# Patient Record
Sex: MALE | Race: WHITE | Employment: OTHER | URBAN - METROPOLITAN AREA
[De-identification: names, ages, dates, MRNs, and addresses within clinical notes are randomized per-mention and may not be internally consistent; named-entity substitution may affect disease eponyms.]

---

## 2017-05-18 ENCOUNTER — TELEPHONE (OUTPATIENT)
Dept: CARDIOLOGY CLINIC | Age: 62
End: 2017-05-18

## 2017-05-18 NOTE — TELEPHONE ENCOUNTER
Per Dr aJhaira Cowan needs a follow with him and device check. PSR tried to contact but numbers are disconnected. Tried emergency contact but no answer or VM. PSR will try to reach again later.

## 2017-05-23 ENCOUNTER — TELEPHONE (OUTPATIENT)
Dept: CARDIOLOGY CLINIC | Age: 62
End: 2017-05-23

## 2017-05-23 NOTE — LETTER
5/23/2017 10:17 AM 
 
Mr. Willian Raman [address] [facility] [address] We have tried to reach you several times by phone but the numbers we have listed for you are not correct. Dr Twyla Blancas would like for you to make an appointment to follow up with him. If you can contact our office as soon as your able to at [phone].    
 
 
 
 
 
 
Sincerely, 
 
 
Viridiana Mauricio MD

## 2017-05-23 NOTE — TELEPHONE ENCOUNTER
----- Message from Latonya Glass sent at 5/18/2017  1:58 PM EDT -----    I reached out to the patient and was told that I had the wrong number. Also contacted emergency contact and the voicemail was not set up so I was unable to leave a message. Will try back at a later time. Thanks!    ----- Message -----     From: Bianca Powers RN     Sent: 5/3/2017  12:22 PM       To: Frank Squires LPN, Latonya Glass  Subject: FWDaphyllis Kaplan - will you try to reach daughter t#     Destinee Figueroa - last conversation was Dec 20 -   Would you mind trying to reach daughter for follow up re: device and Dr. Alphonso Kerr   Thanks!      12/20/16 10:16 AM   ----- Message from Susie Garcia MD sent at 12/19/2016 10:18 PM EST -----  Discharged tonight  Left leg fem pop  Toe amputated  Not sure when he can follow up in office for ICD but may try to make appt when you can and if they cannot come then pls document.    ----- Message -----     From: Susie Garcia MD     Sent: 10/20/2016  10:16 AM       To: Orvilla Boeck, Bianca Powers, RN  Subject: RE: recall, no merlin, non-compliant, social#    Daughter has been involved  Shonda Guillermo spoke to them many times  May be we should send letter if camille cannot get a hold of daughter  If in hospice then I do not think we need to do anything more  ----- Message -----     From: Irina Granado     Sent: 10/20/2016   9:31 AM       To: Susie Garcia MD, [facility], RN, #  Subject: recall, no merlin, non-compliant, social iss#    Hi,    I see he may be in Hospice, MAYE approaching, V pacing <3% in 2014 (last check). He missed many office visits. I read the notes. I mailed out the letter, also requested an ICD check. I am not sure if he is in Hospice, if ICD therapies are off or not. He was not explanted, right? Jarome Kales, please follow up with Mr Filomena Lyonsve [facility]) if we need to address.     Thanks, Sanna Magaña

## 2018-04-07 ENCOUNTER — ANESTHESIA (OUTPATIENT)
Dept: SURGERY | Age: 63
DRG: 253 | End: 2018-04-07
Payer: MEDICARE

## 2018-04-07 ENCOUNTER — ANESTHESIA EVENT (OUTPATIENT)
Dept: SURGERY | Age: 63
DRG: 253 | End: 2018-04-07
Payer: MEDICARE

## 2018-04-07 ENCOUNTER — APPOINTMENT (OUTPATIENT)
Dept: GENERAL RADIOLOGY | Age: 63
DRG: 253 | End: 2018-04-07
Attending: EMERGENCY MEDICINE
Payer: MEDICARE

## 2018-04-07 ENCOUNTER — APPOINTMENT (OUTPATIENT)
Dept: GENERAL RADIOLOGY | Age: 63
DRG: 253 | End: 2018-04-07
Attending: SURGERY
Payer: MEDICARE

## 2018-04-07 ENCOUNTER — APPOINTMENT (OUTPATIENT)
Dept: CT IMAGING | Age: 63
DRG: 253 | End: 2018-04-07
Attending: EMERGENCY MEDICINE
Payer: MEDICARE

## 2018-04-07 ENCOUNTER — HOSPITAL ENCOUNTER (INPATIENT)
Age: 63
LOS: 5 days | Discharge: NURSING, REHAB OR LONG-TERM CARE | DRG: 253 | End: 2018-04-12
Attending: EMERGENCY MEDICINE | Admitting: SURGERY
Payer: MEDICARE

## 2018-04-07 ENCOUNTER — APPOINTMENT (OUTPATIENT)
Dept: CT IMAGING | Age: 63
DRG: 253 | End: 2018-04-07
Attending: SURGERY
Payer: MEDICARE

## 2018-04-07 DIAGNOSIS — I73.9 COLD FOOT WITH PERIPHERAL VASCULAR DISEASE (HCC): ICD-10-CM

## 2018-04-07 DIAGNOSIS — I99.8 ISCHEMIA OF RIGHT LOWER EXTREMITY: Primary | ICD-10-CM

## 2018-04-07 LAB
ALBUMIN SERPL-MCNC: 2.9 G/DL (ref 3.5–5)
ALBUMIN/GLOB SERPL: 0.6 {RATIO} (ref 1.1–2.2)
ALP SERPL-CCNC: 131 U/L (ref 45–117)
ALT SERPL-CCNC: 26 U/L (ref 12–78)
ANION GAP SERPL CALC-SCNC: 7 MMOL/L (ref 5–15)
ANION GAP SERPL CALC-SCNC: 9 MMOL/L (ref 5–15)
APTT PPP: 86.1 SEC (ref 22.1–32)
AST SERPL-CCNC: 43 U/L (ref 15–37)
BASOPHILS # BLD: 0 K/UL (ref 0–0.1)
BASOPHILS # BLD: 0.1 K/UL (ref 0–0.1)
BASOPHILS NFR BLD: 0 % (ref 0–1)
BASOPHILS NFR BLD: 0 % (ref 0–1)
BILIRUB SERPL-MCNC: 0.9 MG/DL (ref 0.2–1)
BUN SERPL-MCNC: 5 MG/DL (ref 6–20)
BUN SERPL-MCNC: 7 MG/DL (ref 6–20)
BUN/CREAT SERPL: 13 (ref 12–20)
BUN/CREAT SERPL: 9 (ref 12–20)
CALCIUM SERPL-MCNC: 7.5 MG/DL (ref 8.5–10.1)
CALCIUM SERPL-MCNC: 8.7 MG/DL (ref 8.5–10.1)
CHLORIDE SERPL-SCNC: 104 MMOL/L (ref 97–108)
CHLORIDE SERPL-SCNC: 107 MMOL/L (ref 97–108)
CO2 SERPL-SCNC: 23 MMOL/L (ref 21–32)
CO2 SERPL-SCNC: 27 MMOL/L (ref 21–32)
CREAT SERPL-MCNC: 0.53 MG/DL (ref 0.7–1.3)
CREAT SERPL-MCNC: 0.55 MG/DL (ref 0.7–1.3)
DIFFERENTIAL METHOD BLD: ABNORMAL
DIFFERENTIAL METHOD BLD: ABNORMAL
EOSINOPHIL # BLD: 0 K/UL (ref 0–0.4)
EOSINOPHIL # BLD: 0.1 K/UL (ref 0–0.4)
EOSINOPHIL NFR BLD: 0 % (ref 0–7)
EOSINOPHIL NFR BLD: 1 % (ref 0–7)
ERYTHROCYTE [DISTWIDTH] IN BLOOD BY AUTOMATED COUNT: 12.9 % (ref 11.5–14.5)
ERYTHROCYTE [DISTWIDTH] IN BLOOD BY AUTOMATED COUNT: 13.1 % (ref 11.5–14.5)
GLOBULIN SER CALC-MCNC: 4.8 G/DL (ref 2–4)
GLUCOSE BLD STRIP.AUTO-MCNC: 85 MG/DL (ref 65–100)
GLUCOSE SERPL-MCNC: 140 MG/DL (ref 65–100)
GLUCOSE SERPL-MCNC: 74 MG/DL (ref 65–100)
HCT VFR BLD AUTO: 39.9 % (ref 36.6–50.3)
HCT VFR BLD AUTO: 47.3 % (ref 36.6–50.3)
HGB BLD-MCNC: 13.4 G/DL (ref 12.1–17)
HGB BLD-MCNC: 16 G/DL (ref 12.1–17)
IMM GRANULOCYTES # BLD: 0.1 K/UL (ref 0–0.04)
IMM GRANULOCYTES # BLD: 0.2 K/UL (ref 0–0.04)
IMM GRANULOCYTES NFR BLD AUTO: 1 % (ref 0–0.5)
IMM GRANULOCYTES NFR BLD AUTO: 1 % (ref 0–0.5)
INR PPP: 1.1 (ref 0.9–1.1)
LACTATE SERPL-SCNC: 1.7 MMOL/L (ref 0.4–2)
LYMPHOCYTES # BLD: 0.4 K/UL (ref 0.8–3.5)
LYMPHOCYTES # BLD: 1.9 K/UL (ref 0.8–3.5)
LYMPHOCYTES NFR BLD: 13 % (ref 12–49)
LYMPHOCYTES NFR BLD: 3 % (ref 12–49)
MCH RBC QN AUTO: 32.2 PG (ref 26–34)
MCH RBC QN AUTO: 32.5 PG (ref 26–34)
MCHC RBC AUTO-ENTMCNC: 33.6 G/DL (ref 30–36.5)
MCHC RBC AUTO-ENTMCNC: 33.8 G/DL (ref 30–36.5)
MCV RBC AUTO: 95.9 FL (ref 80–99)
MCV RBC AUTO: 95.9 FL (ref 80–99)
MONOCYTES # BLD: 0.3 K/UL (ref 0–1)
MONOCYTES # BLD: 1.5 K/UL (ref 0–1)
MONOCYTES NFR BLD: 10 % (ref 5–13)
MONOCYTES NFR BLD: 2 % (ref 5–13)
NEUTS SEG # BLD: 11.7 K/UL (ref 1.8–8)
NEUTS SEG # BLD: 13.9 K/UL (ref 1.8–8)
NEUTS SEG NFR BLD: 76 % (ref 32–75)
NEUTS SEG NFR BLD: 94 % (ref 32–75)
NRBC # BLD: 0 K/UL (ref 0–0.01)
NRBC # BLD: 0 K/UL (ref 0–0.01)
NRBC BLD-RTO: 0 PER 100 WBC
NRBC BLD-RTO: 0 PER 100 WBC
PLATELET # BLD AUTO: 126 K/UL (ref 150–400)
PLATELET # BLD AUTO: 148 K/UL (ref 150–400)
PMV BLD AUTO: 10.8 FL (ref 8.9–12.9)
PMV BLD AUTO: 10.9 FL (ref 8.9–12.9)
POTASSIUM SERPL-SCNC: 3.3 MMOL/L (ref 3.5–5.1)
POTASSIUM SERPL-SCNC: 3.4 MMOL/L (ref 3.5–5.1)
PROT SERPL-MCNC: 7.7 G/DL (ref 6.4–8.2)
PROTHROMBIN TIME: 11.7 SEC (ref 9–11.1)
RBC # BLD AUTO: 4.16 M/UL (ref 4.1–5.7)
RBC # BLD AUTO: 4.93 M/UL (ref 4.1–5.7)
RBC MORPH BLD: ABNORMAL
SERVICE CMNT-IMP: NORMAL
SODIUM SERPL-SCNC: 138 MMOL/L (ref 136–145)
SODIUM SERPL-SCNC: 139 MMOL/L (ref 136–145)
THERAPEUTIC RANGE,PTTT: ABNORMAL SECS (ref 58–77)
WBC # BLD AUTO: 14.7 K/UL (ref 4.1–11.1)
WBC # BLD AUTO: 15.5 K/UL (ref 4.1–11.1)

## 2018-04-07 PROCEDURE — B4101ZZ FLUOROSCOPY OF ABDOMINAL AORTA USING LOW OSMOLAR CONTRAST: ICD-10-PCS | Performed by: SURGERY

## 2018-04-07 PROCEDURE — C1725 CATH, TRANSLUMIN NON-LASER: HCPCS | Performed by: SURGERY

## 2018-04-07 PROCEDURE — 74011250636 HC RX REV CODE- 250/636

## 2018-04-07 PROCEDURE — 74011000250 HC RX REV CODE- 250

## 2018-04-07 PROCEDURE — 76060000037 HC ANESTHESIA 3 TO 3.5 HR: Performed by: SURGERY

## 2018-04-07 PROCEDURE — C1757 CATH, THROMBECTOMY/EMBOLECT: HCPCS | Performed by: SURGERY

## 2018-04-07 PROCEDURE — 74011250636 HC RX REV CODE- 250/636: Performed by: ANESTHESIOLOGY

## 2018-04-07 PROCEDURE — 77030034850: Performed by: SURGERY

## 2018-04-07 PROCEDURE — 76010000133 HC OR TIME 3 TO 3.5 HR: Performed by: SURGERY

## 2018-04-07 PROCEDURE — 80053 COMPREHEN METABOLIC PANEL: CPT | Performed by: EMERGENCY MEDICINE

## 2018-04-07 PROCEDURE — 74011250636 HC RX REV CODE- 250/636: Performed by: EMERGENCY MEDICINE

## 2018-04-07 PROCEDURE — 77030008467 HC STPLR SKN COVD -B: Performed by: SURGERY

## 2018-04-07 PROCEDURE — 77030002916 HC SUT ETHLN J&J -A: Performed by: SURGERY

## 2018-04-07 PROCEDURE — C1769 GUIDE WIRE: HCPCS | Performed by: SURGERY

## 2018-04-07 PROCEDURE — 85730 THROMBOPLASTIN TIME PARTIAL: CPT | Performed by: EMERGENCY MEDICINE

## 2018-04-07 PROCEDURE — 94762 N-INVAS EAR/PLS OXIMTRY CONT: CPT

## 2018-04-07 PROCEDURE — 71045 X-RAY EXAM CHEST 1 VIEW: CPT

## 2018-04-07 PROCEDURE — 80048 BASIC METABOLIC PNL TOTAL CA: CPT | Performed by: SURGERY

## 2018-04-07 PROCEDURE — C1874 STENT, COATED/COV W/DEL SYS: HCPCS | Performed by: SURGERY

## 2018-04-07 PROCEDURE — 99285 EMERGENCY DEPT VISIT HI MDM: CPT

## 2018-04-07 PROCEDURE — 77030005515 HC CATH URETH FOL14 BARD -B: Performed by: SURGERY

## 2018-04-07 PROCEDURE — 74011000272 HC RX REV CODE- 272: Performed by: SURGERY

## 2018-04-07 PROCEDURE — 65660000000 HC RM CCU STEPDOWN

## 2018-04-07 PROCEDURE — 74011636320 HC RX REV CODE- 636/320: Performed by: EMERGENCY MEDICINE

## 2018-04-07 PROCEDURE — 77030010507 HC ADH SKN DERMBND J&J -B: Performed by: SURGERY

## 2018-04-07 PROCEDURE — P9045 ALBUMIN (HUMAN), 5%, 250 ML: HCPCS

## 2018-04-07 PROCEDURE — 77030014007 HC SPNG HEMSTAT J&J -B: Performed by: SURGERY

## 2018-04-07 PROCEDURE — 36415 COLL VENOUS BLD VENIPUNCTURE: CPT | Performed by: EMERGENCY MEDICINE

## 2018-04-07 PROCEDURE — 74018 RADEX ABDOMEN 1 VIEW: CPT

## 2018-04-07 PROCEDURE — 77030031139 HC SUT VCRL2 J&J -A: Performed by: SURGERY

## 2018-04-07 PROCEDURE — 74011250636 HC RX REV CODE- 250/636: Performed by: SURGERY

## 2018-04-07 PROCEDURE — 83605 ASSAY OF LACTIC ACID: CPT | Performed by: EMERGENCY MEDICINE

## 2018-04-07 PROCEDURE — 77030020256 HC SOL INJ NACL 0.9%  500ML: Performed by: SURGERY

## 2018-04-07 PROCEDURE — 77030008684 HC TU ET CUF COVD -B: Performed by: ANESTHESIOLOGY

## 2018-04-07 PROCEDURE — 77030013079 HC BLNKT BAIR HGGR 3M -A: Performed by: ANESTHESIOLOGY

## 2018-04-07 PROCEDURE — 74011000258 HC RX REV CODE- 258: Performed by: EMERGENCY MEDICINE

## 2018-04-07 PROCEDURE — 93005 ELECTROCARDIOGRAM TRACING: CPT

## 2018-04-07 PROCEDURE — 77030002987 HC SUT PROL J&J -B: Performed by: SURGERY

## 2018-04-07 PROCEDURE — C1894 INTRO/SHEATH, NON-LASER: HCPCS | Performed by: SURGERY

## 2018-04-07 PROCEDURE — 77030018673: Performed by: SURGERY

## 2018-04-07 PROCEDURE — 77030026438 HC STYL ET INTUB CARD -A: Performed by: ANESTHESIOLOGY

## 2018-04-07 PROCEDURE — 77030013060 HC DEV INFL PRSS MRTM -B: Performed by: SURGERY

## 2018-04-07 PROCEDURE — 77030002924 HC SUT GORTX WLGO -B: Performed by: SURGERY

## 2018-04-07 PROCEDURE — 85610 PROTHROMBIN TIME: CPT | Performed by: EMERGENCY MEDICINE

## 2018-04-07 PROCEDURE — 88304 TISSUE EXAM BY PATHOLOGIST: CPT | Performed by: SURGERY

## 2018-04-07 PROCEDURE — 74011000250 HC RX REV CODE- 250: Performed by: SURGERY

## 2018-04-07 PROCEDURE — 0J8N0ZZ DIVISION OF RIGHT LOWER LEG SUBCUTANEOUS TISSUE AND FASCIA, OPEN APPROACH: ICD-10-PCS | Performed by: SURGERY

## 2018-04-07 PROCEDURE — C1768 GRAFT, VASCULAR: HCPCS | Performed by: SURGERY

## 2018-04-07 PROCEDURE — 77030011640 HC PAD GRND REM COVD -A: Performed by: SURGERY

## 2018-04-07 PROCEDURE — 77030002986 HC SUT PROL J&J -A: Performed by: SURGERY

## 2018-04-07 PROCEDURE — 77030003704 HC NDL VASC ACC ARMD -A: Performed by: SURGERY

## 2018-04-07 PROCEDURE — 75635 CT ANGIO ABDOMINAL ARTERIES: CPT

## 2018-04-07 PROCEDURE — 76210000016 HC OR PH I REC 1 TO 1.5 HR: Performed by: SURGERY

## 2018-04-07 PROCEDURE — 82962 GLUCOSE BLOOD TEST: CPT

## 2018-04-07 PROCEDURE — 76001 XR FLUOROSCOPY OVER 60 MINUTES: CPT

## 2018-04-07 PROCEDURE — 77030002996 HC SUT SLK J&J -A: Performed by: SURGERY

## 2018-04-07 PROCEDURE — C1887 CATHETER, GUIDING: HCPCS | Performed by: SURGERY

## 2018-04-07 PROCEDURE — 85025 COMPLETE CBC W/AUTO DIFF WBC: CPT | Performed by: EMERGENCY MEDICINE

## 2018-04-07 PROCEDURE — 04CK0ZZ EXTIRPATION OF MATTER FROM RIGHT FEMORAL ARTERY, OPEN APPROACH: ICD-10-PCS | Performed by: SURGERY

## 2018-04-07 PROCEDURE — 047K3DZ DILATION OF RIGHT FEMORAL ARTERY WITH INTRALUMINAL DEVICE, PERCUTANEOUS APPROACH: ICD-10-PCS | Performed by: SURGERY

## 2018-04-07 DEVICE — IMPLANTABLE DEVICE: Type: IMPLANTABLE DEVICE | Site: GROIN | Status: FUNCTIONAL

## 2018-04-07 DEVICE — HEMASHIELD PLATINUM FINESSE ULTRA-THIN KNITTED CARDIOVASCULAR PATCH
Type: IMPLANTABLE DEVICE | Site: GROIN | Status: FUNCTIONAL
Brand: HEMASHIELD

## 2018-04-07 RX ORDER — LIDOCAINE HYDROCHLORIDE 20 MG/ML
INJECTION, SOLUTION EPIDURAL; INFILTRATION; INTRACAUDAL; PERINEURAL AS NEEDED
Status: DISCONTINUED | OUTPATIENT
Start: 2018-04-07 | End: 2018-04-07 | Stop reason: HOSPADM

## 2018-04-07 RX ORDER — SODIUM CHLORIDE 0.9 % (FLUSH) 0.9 %
5-10 SYRINGE (ML) INJECTION AS NEEDED
Status: DISCONTINUED | OUTPATIENT
Start: 2018-04-07 | End: 2018-04-07 | Stop reason: HOSPADM

## 2018-04-07 RX ORDER — SODIUM CHLORIDE 0.9 % (FLUSH) 0.9 %
10 SYRINGE (ML) INJECTION
Status: COMPLETED | OUTPATIENT
Start: 2018-04-07 | End: 2018-04-07

## 2018-04-07 RX ORDER — FENTANYL CITRATE 50 UG/ML
INJECTION, SOLUTION INTRAMUSCULAR; INTRAVENOUS AS NEEDED
Status: DISCONTINUED | OUTPATIENT
Start: 2018-04-07 | End: 2018-04-07 | Stop reason: HOSPADM

## 2018-04-07 RX ORDER — HYDROMORPHONE HYDROCHLORIDE 2 MG/ML
0.2 INJECTION, SOLUTION INTRAMUSCULAR; INTRAVENOUS; SUBCUTANEOUS
Status: DISCONTINUED | OUTPATIENT
Start: 2018-04-07 | End: 2018-04-08 | Stop reason: HOSPADM

## 2018-04-07 RX ORDER — SODIUM CHLORIDE, SODIUM LACTATE, POTASSIUM CHLORIDE, CALCIUM CHLORIDE 600; 310; 30; 20 MG/100ML; MG/100ML; MG/100ML; MG/100ML
100 INJECTION, SOLUTION INTRAVENOUS CONTINUOUS
Status: DISCONTINUED | OUTPATIENT
Start: 2018-04-07 | End: 2018-04-08 | Stop reason: HOSPADM

## 2018-04-07 RX ORDER — SUCCINYLCHOLINE CHLORIDE 20 MG/ML
INJECTION INTRAMUSCULAR; INTRAVENOUS AS NEEDED
Status: DISCONTINUED | OUTPATIENT
Start: 2018-04-07 | End: 2018-04-07 | Stop reason: HOSPADM

## 2018-04-07 RX ORDER — GLYCOPYRROLATE 0.2 MG/ML
INJECTION INTRAMUSCULAR; INTRAVENOUS AS NEEDED
Status: DISCONTINUED | OUTPATIENT
Start: 2018-04-07 | End: 2018-04-07 | Stop reason: HOSPADM

## 2018-04-07 RX ORDER — DEXAMETHASONE SODIUM PHOSPHATE 4 MG/ML
INJECTION, SOLUTION INTRA-ARTICULAR; INTRALESIONAL; INTRAMUSCULAR; INTRAVENOUS; SOFT TISSUE AS NEEDED
Status: DISCONTINUED | OUTPATIENT
Start: 2018-04-07 | End: 2018-04-07 | Stop reason: HOSPADM

## 2018-04-07 RX ORDER — FENTANYL CITRATE 50 UG/ML
25 INJECTION, SOLUTION INTRAMUSCULAR; INTRAVENOUS
Status: COMPLETED | OUTPATIENT
Start: 2018-04-07 | End: 2018-04-08

## 2018-04-07 RX ORDER — ONDANSETRON 2 MG/ML
4 INJECTION INTRAMUSCULAR; INTRAVENOUS AS NEEDED
Status: DISCONTINUED | OUTPATIENT
Start: 2018-04-07 | End: 2018-04-08 | Stop reason: HOSPADM

## 2018-04-07 RX ORDER — SODIUM CHLORIDE 0.9 % (FLUSH) 0.9 %
5-10 SYRINGE (ML) INJECTION AS NEEDED
Status: DISCONTINUED | OUTPATIENT
Start: 2018-04-07 | End: 2018-04-08 | Stop reason: HOSPADM

## 2018-04-07 RX ORDER — THERA TABS 400 MCG
1 TAB ORAL DAILY
COMMUNITY

## 2018-04-07 RX ORDER — ALBUMIN HUMAN 50 G/1000ML
SOLUTION INTRAVENOUS AS NEEDED
Status: DISCONTINUED | OUTPATIENT
Start: 2018-04-07 | End: 2018-04-07 | Stop reason: HOSPADM

## 2018-04-07 RX ORDER — ROPIVACAINE HYDROCHLORIDE 5 MG/ML
30 INJECTION, SOLUTION EPIDURAL; INFILTRATION; PERINEURAL AS NEEDED
Status: DISCONTINUED | OUTPATIENT
Start: 2018-04-07 | End: 2018-04-07 | Stop reason: HOSPADM

## 2018-04-07 RX ORDER — SODIUM CHLORIDE 9 MG/ML
100 INJECTION, SOLUTION INTRAVENOUS CONTINUOUS
Status: DISCONTINUED | OUTPATIENT
Start: 2018-04-07 | End: 2018-04-09

## 2018-04-07 RX ORDER — MIDAZOLAM HYDROCHLORIDE 1 MG/ML
0.5 INJECTION, SOLUTION INTRAMUSCULAR; INTRAVENOUS
Status: DISCONTINUED | OUTPATIENT
Start: 2018-04-07 | End: 2018-04-08 | Stop reason: HOSPADM

## 2018-04-07 RX ORDER — MIDAZOLAM HYDROCHLORIDE 1 MG/ML
1 INJECTION, SOLUTION INTRAMUSCULAR; INTRAVENOUS AS NEEDED
Status: DISCONTINUED | OUTPATIENT
Start: 2018-04-07 | End: 2018-04-07 | Stop reason: HOSPADM

## 2018-04-07 RX ORDER — PHENYLEPHRINE HCL IN 0.9% NACL 0.4MG/10ML
SYRINGE (ML) INTRAVENOUS AS NEEDED
Status: DISCONTINUED | OUTPATIENT
Start: 2018-04-07 | End: 2018-04-07 | Stop reason: HOSPADM

## 2018-04-07 RX ORDER — SODIUM CHLORIDE 0.9 % (FLUSH) 0.9 %
5-10 SYRINGE (ML) INJECTION EVERY 8 HOURS
Status: DISCONTINUED | OUTPATIENT
Start: 2018-04-08 | End: 2018-04-12 | Stop reason: HOSPADM

## 2018-04-07 RX ORDER — SODIUM CHLORIDE 0.9 % (FLUSH) 0.9 %
5-10 SYRINGE (ML) INJECTION AS NEEDED
Status: DISCONTINUED | OUTPATIENT
Start: 2018-04-07 | End: 2018-04-12 | Stop reason: HOSPADM

## 2018-04-07 RX ORDER — FENTANYL CITRATE 50 UG/ML
50 INJECTION, SOLUTION INTRAMUSCULAR; INTRAVENOUS AS NEEDED
Status: DISCONTINUED | OUTPATIENT
Start: 2018-04-07 | End: 2018-04-07 | Stop reason: HOSPADM

## 2018-04-07 RX ORDER — SODIUM CHLORIDE 9 MG/ML
25 INJECTION, SOLUTION INTRAVENOUS CONTINUOUS
Status: DISCONTINUED | OUTPATIENT
Start: 2018-04-07 | End: 2018-04-07 | Stop reason: HOSPADM

## 2018-04-07 RX ORDER — HEPARIN SODIUM 10000 [USP'U]/100ML
18-36 INJECTION, SOLUTION INTRAVENOUS
Status: DISCONTINUED | OUTPATIENT
Start: 2018-04-07 | End: 2018-04-09

## 2018-04-07 RX ORDER — ROCURONIUM BROMIDE 10 MG/ML
INJECTION, SOLUTION INTRAVENOUS AS NEEDED
Status: DISCONTINUED | OUTPATIENT
Start: 2018-04-07 | End: 2018-04-07 | Stop reason: HOSPADM

## 2018-04-07 RX ORDER — CEFAZOLIN SODIUM 1 G/3ML
INJECTION, POWDER, FOR SOLUTION INTRAMUSCULAR; INTRAVENOUS AS NEEDED
Status: DISCONTINUED | OUTPATIENT
Start: 2018-04-07 | End: 2018-04-07 | Stop reason: HOSPADM

## 2018-04-07 RX ORDER — PROPOFOL 10 MG/ML
INJECTION, EMULSION INTRAVENOUS AS NEEDED
Status: DISCONTINUED | OUTPATIENT
Start: 2018-04-07 | End: 2018-04-07 | Stop reason: HOSPADM

## 2018-04-07 RX ORDER — LIDOCAINE HYDROCHLORIDE 10 MG/ML
0.1 INJECTION, SOLUTION EPIDURAL; INFILTRATION; INTRACAUDAL; PERINEURAL AS NEEDED
Status: DISCONTINUED | OUTPATIENT
Start: 2018-04-07 | End: 2018-04-07 | Stop reason: HOSPADM

## 2018-04-07 RX ORDER — NEOSTIGMINE METHYLSULFATE 1 MG/ML
INJECTION INTRAVENOUS AS NEEDED
Status: DISCONTINUED | OUTPATIENT
Start: 2018-04-07 | End: 2018-04-07 | Stop reason: HOSPADM

## 2018-04-07 RX ORDER — HYDROMORPHONE HYDROCHLORIDE 2 MG/ML
INJECTION, SOLUTION INTRAMUSCULAR; INTRAVENOUS; SUBCUTANEOUS AS NEEDED
Status: DISCONTINUED | OUTPATIENT
Start: 2018-04-07 | End: 2018-04-07 | Stop reason: HOSPADM

## 2018-04-07 RX ORDER — SODIUM CHLORIDE, SODIUM LACTATE, POTASSIUM CHLORIDE, CALCIUM CHLORIDE 600; 310; 30; 20 MG/100ML; MG/100ML; MG/100ML; MG/100ML
25 INJECTION, SOLUTION INTRAVENOUS CONTINUOUS
Status: DISCONTINUED | OUTPATIENT
Start: 2018-04-07 | End: 2018-04-07 | Stop reason: HOSPADM

## 2018-04-07 RX ORDER — LORAZEPAM 2 MG/ML
1 INJECTION INTRAMUSCULAR
Status: COMPLETED | OUTPATIENT
Start: 2018-04-07 | End: 2018-04-07

## 2018-04-07 RX ORDER — SODIUM CHLORIDE 0.9 % (FLUSH) 0.9 %
5-10 SYRINGE (ML) INJECTION EVERY 8 HOURS
Status: DISCONTINUED | OUTPATIENT
Start: 2018-04-07 | End: 2018-04-07 | Stop reason: HOSPADM

## 2018-04-07 RX ORDER — ACETAMINOPHEN 10 MG/ML
INJECTION, SOLUTION INTRAVENOUS AS NEEDED
Status: DISCONTINUED | OUTPATIENT
Start: 2018-04-07 | End: 2018-04-07 | Stop reason: HOSPADM

## 2018-04-07 RX ORDER — DIPHENHYDRAMINE HYDROCHLORIDE 50 MG/ML
12.5 INJECTION, SOLUTION INTRAMUSCULAR; INTRAVENOUS AS NEEDED
Status: ACTIVE | OUTPATIENT
Start: 2018-04-07 | End: 2018-04-07

## 2018-04-07 RX ORDER — HEPARIN SODIUM 1000 [USP'U]/ML
INJECTION, SOLUTION INTRAVENOUS; SUBCUTANEOUS AS NEEDED
Status: DISCONTINUED | OUTPATIENT
Start: 2018-04-07 | End: 2018-04-07 | Stop reason: HOSPADM

## 2018-04-07 RX ORDER — ONDANSETRON 2 MG/ML
INJECTION INTRAMUSCULAR; INTRAVENOUS AS NEEDED
Status: DISCONTINUED | OUTPATIENT
Start: 2018-04-07 | End: 2018-04-07 | Stop reason: HOSPADM

## 2018-04-07 RX ADMIN — LIDOCAINE HYDROCHLORIDE 50 MG: 20 INJECTION, SOLUTION EPIDURAL; INFILTRATION; INTRACAUDAL; PERINEURAL at 18:31

## 2018-04-07 RX ADMIN — ROCURONIUM BROMIDE 5 MG: 10 INJECTION, SOLUTION INTRAVENOUS at 20:55

## 2018-04-07 RX ADMIN — Medication 80 MCG: at 18:44

## 2018-04-07 RX ADMIN — Medication 10 ML: at 16:22

## 2018-04-07 RX ADMIN — Medication 40 MCG: at 19:19

## 2018-04-07 RX ADMIN — SODIUM CHLORIDE 100 ML: 900 INJECTION, SOLUTION INTRAVENOUS at 16:22

## 2018-04-07 RX ADMIN — Medication 40 MCG: at 19:21

## 2018-04-07 RX ADMIN — CEFAZOLIN SODIUM 2 G: 1 INJECTION, POWDER, FOR SOLUTION INTRAMUSCULAR; INTRAVENOUS at 18:39

## 2018-04-07 RX ADMIN — FENTANYL CITRATE 50 MCG: 50 INJECTION, SOLUTION INTRAMUSCULAR; INTRAVENOUS at 19:01

## 2018-04-07 RX ADMIN — ONDANSETRON 4 MG: 2 INJECTION INTRAMUSCULAR; INTRAVENOUS at 21:03

## 2018-04-07 RX ADMIN — ROCURONIUM BROMIDE 10 MG: 10 INJECTION, SOLUTION INTRAVENOUS at 19:54

## 2018-04-07 RX ADMIN — SUCCINYLCHOLINE CHLORIDE 140 MG: 20 INJECTION INTRAMUSCULAR; INTRAVENOUS at 18:32

## 2018-04-07 RX ADMIN — HEPARIN SODIUM 5000 UNITS: 1000 INJECTION, SOLUTION INTRAVENOUS; SUBCUTANEOUS at 19:18

## 2018-04-07 RX ADMIN — SODIUM CHLORIDE 1000 ML: 900 INJECTION, SOLUTION INTRAVENOUS at 15:42

## 2018-04-07 RX ADMIN — Medication 40 MCG: at 18:31

## 2018-04-07 RX ADMIN — FENTANYL CITRATE 25 MCG: 50 INJECTION, SOLUTION INTRAMUSCULAR; INTRAVENOUS at 23:32

## 2018-04-07 RX ADMIN — ALBUMIN HUMAN 250 ML: 50 SOLUTION INTRAVENOUS at 20:43

## 2018-04-07 RX ADMIN — ROCURONIUM BROMIDE 5 MG: 10 INJECTION, SOLUTION INTRAVENOUS at 20:18

## 2018-04-07 RX ADMIN — DEXAMETHASONE SODIUM PHOSPHATE 4 MG: 4 INJECTION, SOLUTION INTRA-ARTICULAR; INTRALESIONAL; INTRAMUSCULAR; INTRAVENOUS; SOFT TISSUE at 19:09

## 2018-04-07 RX ADMIN — HYDROMORPHONE HYDROCHLORIDE 0.2 MG: 2 INJECTION, SOLUTION INTRAMUSCULAR; INTRAVENOUS; SUBCUTANEOUS at 21:06

## 2018-04-07 RX ADMIN — SODIUM CHLORIDE, SODIUM LACTATE, POTASSIUM CHLORIDE, AND CALCIUM CHLORIDE 25 ML/HR: 600; 310; 30; 20 INJECTION, SOLUTION INTRAVENOUS at 18:02

## 2018-04-07 RX ADMIN — ROCURONIUM BROMIDE 5 MG: 10 INJECTION, SOLUTION INTRAVENOUS at 18:31

## 2018-04-07 RX ADMIN — FENTANYL CITRATE 25 MCG: 50 INJECTION, SOLUTION INTRAMUSCULAR; INTRAVENOUS at 22:50

## 2018-04-07 RX ADMIN — LORAZEPAM 1 MG: 2 INJECTION INTRAMUSCULAR; INTRAVENOUS at 17:23

## 2018-04-07 RX ADMIN — Medication 40 MCG: at 19:17

## 2018-04-07 RX ADMIN — HYDROMORPHONE HYDROCHLORIDE 0.5 MG: 2 INJECTION, SOLUTION INTRAMUSCULAR; INTRAVENOUS; SUBCUTANEOUS at 21:43

## 2018-04-07 RX ADMIN — Medication 80 MCG: at 21:12

## 2018-04-07 RX ADMIN — FENTANYL CITRATE 50 MCG: 50 INJECTION, SOLUTION INTRAMUSCULAR; INTRAVENOUS at 18:31

## 2018-04-07 RX ADMIN — ACETAMINOPHEN 1000 MG: 10 INJECTION, SOLUTION INTRAVENOUS at 18:42

## 2018-04-07 RX ADMIN — ALBUMIN HUMAN 250 ML: 50 SOLUTION INTRAVENOUS at 20:04

## 2018-04-07 RX ADMIN — FENTANYL CITRATE 25 MCG: 50 INJECTION, SOLUTION INTRAMUSCULAR; INTRAVENOUS at 23:00

## 2018-04-07 RX ADMIN — FENTANYL CITRATE 25 MCG: 50 INJECTION, SOLUTION INTRAMUSCULAR; INTRAVENOUS at 21:05

## 2018-04-07 RX ADMIN — HEPARIN SODIUM 2000 UNITS: 1000 INJECTION, SOLUTION INTRAVENOUS; SUBCUTANEOUS at 20:13

## 2018-04-07 RX ADMIN — GLYCOPYRROLATE 0.5 MG: 0.2 INJECTION INTRAMUSCULAR; INTRAVENOUS at 21:22

## 2018-04-07 RX ADMIN — SODIUM CHLORIDE, SODIUM LACTATE, POTASSIUM CHLORIDE, AND CALCIUM CHLORIDE: 600; 310; 30; 20 INJECTION, SOLUTION INTRAVENOUS at 20:55

## 2018-04-07 RX ADMIN — PROPOFOL 80 MG: 10 INJECTION, EMULSION INTRAVENOUS at 18:31

## 2018-04-07 RX ADMIN — HEPARIN SODIUM AND DEXTROSE 18 UNITS/KG/HR: 10000; 5 INJECTION INTRAVENOUS at 22:36

## 2018-04-07 RX ADMIN — IOPAMIDOL 100 ML: 755 INJECTION, SOLUTION INTRAVENOUS at 16:22

## 2018-04-07 RX ADMIN — ROCURONIUM BROMIDE 20 MG: 10 INJECTION, SOLUTION INTRAVENOUS at 18:54

## 2018-04-07 RX ADMIN — NEOSTIGMINE METHYLSULFATE 3 MG: 1 INJECTION INTRAVENOUS at 21:22

## 2018-04-07 NOTE — ROUTINE PROCESS
TRANSFER - OUT REPORT:    Verbal report given to Sabina LAI(name) on Saúl Livings  being transferred to OR(unit) for ordered procedure       Report consisted of patients Situation, Background, Assessment and   Recommendations(SBAR). Information from the following report(s) SBAR and Recent Results was reviewed with the receiving nurse. Lines:   Peripheral IV 04/07/18 Right Antecubital (Active)   Site Assessment Clean, dry, & intact 4/7/2018  3:30 PM   Phlebitis Assessment 0 4/7/2018  3:30 PM   Infiltration Assessment 0 4/7/2018  3:30 PM   Dressing Status Clean, dry, & intact 4/7/2018  3:30 PM   Dressing Type Transparent 4/7/2018  3:30 PM   Hub Color/Line Status Pink;Flushed;Patent 4/7/2018  3:30 PM   Action Taken Blood drawn 4/7/2018  3:30 PM       Peripheral IV 04/07/18 Left Antecubital (Active)   Site Assessment Clean, dry, & intact 4/7/2018  3:31 PM   Phlebitis Assessment 0 4/7/2018  3:31 PM   Infiltration Assessment 0 4/7/2018  3:31 PM   Dressing Status Clean, dry, & intact 4/7/2018  3:31 PM   Dressing Type Transparent 4/7/2018  3:31 PM   Hub Color/Line Status Green;Flushed;Patent 4/7/2018  3:31 PM        Opportunity for questions and clarification was provided.       Patient transported with:   Dr. Dupree Body

## 2018-04-07 NOTE — IP AVS SNAPSHOT
[facility] 
[phone] Patient: Nadine Martinez MRN: [id number] ZVP:[id number] A check dania indicates which time of day the medication should be taken. My Medications START taking these medications Instructions Each Dose to Equal  
 Morning Noon Evening Bedtime  
 apixaban 5 mg tablet Commonly known as:  Vercora Robbins Your last dose was: Your next dose is: Take 1 Tab by mouth two (2) times a day. 5 mg  
    
   
   
   
  
 aspirin 81 mg chewable tablet Your last dose was: Your next dose is: Take 1 Tab by mouth daily. 81 mg  
    
   
   
   
  
 carvedilol 3.125 mg tablet Commonly known as:  Cornelius Lacrosse Your last dose was: Your next dose is: Take 1 Tab by mouth two (2) times daily (with meals). 3.125 mg  
    
   
   
   
  
 gabapentin 100 mg capsule Commonly known as:  NEURONTIN Your last dose was: Your next dose is: Take 1 Cap by mouth three (3) times daily. 100 mg HYDROcodone-acetaminophen 7.5-325 mg per tablet Commonly known as:  Maria Eugenia Wallace Your last dose was: Your next dose is: Take 1 Tab by mouth every four (4) hours as needed. Max Daily Amount: 6 Tabs. 1 Tab CONTINUE taking these medications Instructions Each Dose to Equal  
 Morning Noon Evening Bedtime  
 lisinopril 10 mg tablet Commonly known as:  Edson Bell Your last dose was: Your next dose is: Take 10 mg by mouth daily. 10 mg  
    
   
   
   
  
 therapeutic multivitamin tablet Commonly known as:  [facility] Your last dose was: Your next dose is: Take 1 Tab by mouth daily. 1 Tab STOP taking these medications   
 oxyCODONE-acetaminophen 5-325 mg per tablet Commonly known as:  PERCOCET Where to Get Your Medications Information on where to get these meds will be given to you by the nurse or doctor. ! Ask your nurse or doctor about these medications  
  apixaban 5 mg tablet  
 aspirin 81 mg chewable tablet  
 carvedilol 3.125 mg tablet  
 gabapentin 100 mg capsule HYDROcodone-acetaminophen 7.5-325 mg per tablet

## 2018-04-07 NOTE — ED TRIAGE NOTES
Per EMS pt arrives from [facility] standing ER. Pt fell this past Sunday injuring his right leg and was seen @ [facility].  EMS states pt does not have pedal pulse to right foot. Pt was given 5000 units for heparin bolus prior to transporting.

## 2018-04-07 NOTE — IP AVS SNAPSHOT
[address] [facility] [address] [place] [address] 
[phone] Patient: Rhea Biggs MRN: [id number] NCL:8/[id number] About your hospitalization You were admitted on:  April 8, 2018 You last received care in the:  [facility] 4 SURG/BARIATRICS You were discharged on:  April 12, 2018 Why you were hospitalized Your primary diagnosis was:  Not on File Your diagnoses also included:  Ischemia Of Right Lower Extremity Follow-up Information Follow up With Details Comments Contact Info [address] [place] [address] [phone] Sandra Reynolds MD Schedule an appointment as soon as possible for a visit in 2 weeks  Raheem Bruceviky [facility] [address] [place] [address] 
[phone] Urmila Coughlin Go on 5/3/2018 hospital PCP f/u appointment on Thursday May 3, 2018 @ 10:00 a.m. If patient is unable to attend,please call the office. [address] [facility] 157, DOMIN[address] Discharge Orders None A check dania indicates which time of day the medication should be taken. My Medications START taking these medications Instructions Each Dose to Equal  
 Morning Noon Evening Bedtime  
 apixaban 5 mg tablet Commonly known as:  Armstrong Sheets Your last dose was: Your next dose is: Take 1 Tab by mouth two (2) times a day. 5 mg  
    
   
   
   
  
 aspirin 81 mg chewable tablet Your last dose was: Your next dose is: Take 1 Tab by mouth daily. 81 mg  
    
   
   
   
  
 carvedilol 3.125 mg tablet Commonly known as:  Tera Lundrissag Your last dose was: Your next dose is: Take 1 Tab by mouth two (2) times daily (with meals). 3.125 mg  
    
   
   
   
  
 gabapentin 100 mg capsule Commonly known as:  NEURONTIN Your last dose was: Your next dose is:     
   
   
 Take 1 Cap by mouth three (3) times daily. 100 mg HYDROcodone-acetaminophen 7.5-325 mg per tablet Commonly known as:  Mitul Lackey Your last dose was: Your next dose is: Take 1 Tab by mouth every four (4) hours as needed. Max Daily Amount: 6 Tabs. 1 Tab CONTINUE taking these medications Instructions Each Dose to Equal  
 Morning Noon Evening Bedtime  
 lisinopril 10 mg tablet Commonly known as:  Linda Saucedo Your last dose was: Your next dose is: Take 10 mg by mouth daily. 10 mg  
    
   
   
   
  
 therapeutic multivitamin tablet Commonly known as:  [facility] Your last dose was: Your next dose is: Take 1 Tab by mouth daily. 1 Tab STOP taking these medications   
 oxyCODONE-acetaminophen 5-325 mg per tablet Commonly known as:  PERCOCET Where to Get Your Medications Information on where to get these meds will be given to you by the nurse or doctor. ! Ask your nurse or doctor about these medications  
  apixaban 5 mg tablet  
 aspirin 81 mg chewable tablet  
 carvedilol 3.125 mg tablet  
 gabapentin 100 mg capsule HYDROcodone-acetaminophen 7.5-325 mg per tablet Opioid Education Prescription Opioids: What You Need to Know: 
 
Prescription opioids can be used to help relieve moderate-to-severe pain and are often prescribed following a surgery or injury, or for certain health conditions. These medications can be an important part of treatment but also come with serious risks. Opioids are strong pain medicines. Examples include hydrocodone, oxycodone, fentanyl, and morphine. Heroin is an example of an illegal opioid. It is important to work with your health care provider to make sure you are getting the safest, most effective care. WHAT ARE THE RISKS AND SIDE EFFECTS OF OPIOID USE?  
Prescription opioids carry serious risks of addiction and overdose, especially with prolonged use. An opioid overdose, often marked by slow breathing, can cause sudden death. The use of prescription opioids can have a number of side effects as well, even when taken as directed. · Tolerance-meaning you might need to take more of a medication for the same pain relief · Physical dependence-meaning you have symptoms of withdrawal when the medication is stopped. Withdrawal symptoms can include nausea, sweating, chills, diarrhea, stomach cramps, and muscle aches. Withdrawal can last up to several weeks, depending on which drug you took and how long you took it. · Increased sensitivity to pain · Constipation · Nausea, vomiting, and dry mouth · Sleepiness and dizziness · Confusion · Depression · Low levels of testosterone that can result in lower sex drive, energy, and strength · Itching and sweating RISKS ARE GREATER WITH:      
· History of drug misuse, substance use disorder, or overdose · Mental health conditions (such as depression or anxiety) · Sleep apnea · Older age (72 years or older) · Pregnancy Avoid alcohol while taking prescription opioids. Also, unless specifically advised by your health care provider, medications to avoid include: · Benzodiazepines (such as Xanax or Valium) · Muscle relaxants (such as Soma or Flexeril) · Hypnotics (such as Ambien or Lunesta) · Other prescription opioids KNOW YOUR OPTIONS Talk to your health care provider about ways to manage your pain that don't involve prescription opioids. Some of these options may actually work better and have fewer risks and side effects. Options may include: 
· Pain relievers such as acetaminophen, ibuprofen, and naproxen · Some medications that are also used for depression or seizures · Physical therapy and exercise · Counseling to help patients learn how to cope better with triggers of pain and stress. · Application of heat or cold compress · Massage therapy · Relaxation techniques Be Informed Make sure you know the name of your medication, how much and how often to take it, and its potential risks & side effects. IF YOU ARE PRESCRIBED OPIOIDS FOR PAIN: 
· Never take opioids in greater amounts or more often than prescribed. Remember the goal is not to be pain-free but to manage your pain at a tolerable level. · Follow up with your primary care provider to: · Work together to create a plan on how to manage your pain. · Talk about ways to help manage your pain that don't involve prescription opioids. · Talk about any and all concerns and side effects. · Help prevent misuse and abuse. · Never sell or share prescription opioids · Help prevent misuse and abuse. · Store prescription opioids in a secure place and out of reach of others (this may include visitors, children, friends, and family). · Safely dispose of unused/unwanted prescription opioids: Find your community drug take-back program or your pharmacy mail-back program, or flush them down the toilet, following guidance from the Food and Drug Administration (www.fda.gov/Drugs/ResourcesForYou). · Visit www.cdc.gov/drugoverdose to learn about the risks of opioid abuse and overdose. · If you believe you may be struggling with addiction, tell your health care provider and ask for guidance or call [address] [place], [place] [address] at [phone]. Discharge Instructions Ok to shower. Please keep dry gauze in groin at all times, change once daily. Normal activity. [facility] Announcement We are excited to announce that we are making your provider's discharge notes available to you in CCBR[facility]SYNARC. You will see these notes when they are completed and signed by the physician that discharged you from your recent hospital stay.   If you have any questions or concerns about any information you see in [employer], please call the Health Information Department where you were seen or reach out to your Primary Care Provider for more information about your plan of care. Introducing [facility] [place] [facility]! [facility] introduces Patient Access Solutions patient portal. Now you can access parts of your medical record, email your doctor's office, and request medication refills online. 1. In your internet browser, go to [email or web address] 2. Click on the First Time User? Click Here link in the Sign In box. You will see the New Member Sign Up page. 3. Enter your Patient Access Solutions Access Code exactly as it appears below. You will not need to use this code after youve completed the sign-up process. If you do not sign up before the expiration date, you must request a new code. · Patient Access Solutions Access Code: [email or web address] Expires: 7/6/2018  3:20 PM 
 
4. Enter the last four digits of your Social Security Number (xxxx) and Date of Birth (mm/dd/yyyy) as indicated and click Submit. You will be taken to the next sign-up page. 5. Create a Patient Access Solutions ID. This will be your Patient Access Solutions login ID and cannot be changed, so think of one that is secure and easy to remember. 6. Create a Patient Access Solutions password. You can change your password at any time. 7. Enter your Password Reset Question and Answer. This can be used at a later time if you forget your password. 8. Enter your e-mail address. You will receive e-mail notification when new information is available in [address] 9. Click Sign Up. You can now view and download portions of your medical record. 10. Click the Download Summary menu link to download a portable copy of your medical information. If you have questions, please visit the Frequently Asked Questions section of the Patient Access Solutions website. Remember, Patient Access Solutions is NOT to be used for urgent needs. For medical emergencies, dial 911. Now available from your iPhone and Android! Introducing Cr Caldera As a [facility] patient, I wanted to make you aware of our electronic visit tool called Cr EcociclushenriqueGet Together. [employer] 24/7 allows you to connect within minutes with a medical provider 24 hours a day, seven days a week via a mobile device or tablet or logging into a secure website from your computer. You can access ImmuneWorks from anywhere in the [place]. A virtual visit might be right for you when you have a simple condition and feel like you just dont want to get out of bed, or cant get away from work for an appointment, when your regular [facility] provider is not available (evenings, weekends or holidays), or when youre out of town and need minor care. Electronic visits cost only $49 and if the [employer] 24/7 provider determines a prescription is needed to treat your condition, one can be electronically transmitted to a nearby pharmacy*. Please take a moment to enroll today if you have not already done so. The enrollment process is free and takes just a few minutes. To enroll, please download the [facility] 24/7 elba to your tablet or phone, or visit [email or web address] to enroll on your computer. And, as an [address] [place] [address] patient with a [employer] account, the results of your visits will be scanned into your electronic medical record and your primary care provider will be able to view the scanned results. We urge you to continue to see your regular [facility] provider for your ongoing medical care. And while your primary care provider may not be the one available when you seek a ImmuneWorks virtual visit, the peace of mind you get from getting a real diagnosis real time can be priceless. For more information on ImmuneWorks, view our Frequently Asked Questions (FAQs) at [email or web address]. Sincerely, 
 
Dai Mahmood MD 
Chief Medical Officer [employer] *:  certain medications cannot be prescribed via ImmuneWorks Providers Seen During Your Hospitalization Provider Specialty Primary office phone Vanessa Vazquez MD Emergency Medicine [phone] Joseph Javier MD Vascular Surgery [phone] Your Primary Care Physician (PCP) Primary Care Physician Office Phone Office Fax ÁLVARO HOLLINS ** None ** ** None ** You are allergic to the following Allergen Reactions Codeine Nausea and Vomiting Other (comments) Morphine Itching Other (comments) \"crazy\" per family Recent Documentation Height Weight BMI Smoking Status 1.626 m 59.8 kg 22.62 kg/m2 Current Every Day Smoker Emergency Contacts Name Discharge Info Relation Home Work Mobile [facility] DISCHARGE CAREGIVER [3] Child [2] [phone] Sandra Wheeler N/A  AT THIS TIME [6] Child [2] [phone] Patient Belongings The following personal items are in your possession at time of discharge: 
  Dental Appliances: None  Visual Aid: Glasses, At bedside             Clothing: None Please provide this summary of care documentation to your next provider. Signatures-by signing, you are acknowledging that this After Visit Summary has been reviewed with you and you have received a copy. Patient Signature:  ____________________________________________________________ Date:  ____________________________________________________________  
  
[place] Provider Signature:  ____________________________________________________________ Date:  ____________________________________________________________

## 2018-04-07 NOTE — CONSULTS
Vascular Surgery History and Physical    Subjective:      Sheri Sevilla is a 60 y.o. male who presents with right lower extremity ischemia. He is a 60year old male with early onset dementia and lives with his daughter presents with right leg pain. He apparently started complaining of pain and inability to walk about one week ago. He was evaluated at a outside hospital for hip pain but this was all negative. Apparently the pain continued to worsen and his daughter looked at his feet and they were discolored. He is not a great historian but he does most of his ADLs with his daughter. He was previously walking. He has a signficant vascular history with an aortobifemoral bypass, left fem-pop bypass and possibly a previous infected fem-fem from review of the previous records. He is having severe pain in his right leg and swelling. He is able to move his toes and says his foot is numb. Past Medical History:   Diagnosis Date    CAD (coronary artery disease) 12/10/2010    Chronic airway obstruction, not elsewhere classified 12/10/2010    Chronic systolic heart failure ([address] 12/10/2010    COPD [facility]     GERD (gastroesophageal reflux disease)     ICD (implantable cardioverter-defibrillator) in place     Ischemic cardiomyopathy     PAD (peripheral artery disease) ([address]     Tobacco use disorder      History reviewed. No pertinent surgical history. History reviewed. No pertinent family history. Social History   Substance Use Topics    Smoking status: Current Every Day Smoker     Packs/day: 1.00     Years: 44.00     Types: Cigarettes    Smokeless tobacco: Never Used    Alcohol use Yes      Comment: rarely      Prior to Admission medications    Medication Sig Start Date End Date Taking? Authorizing Provider   acetaminophen (TYLENOL) 325 mg tablet Take 2 Tabs by mouth every four (4) hours as needed.  12/19/16   Kalie Small MD   carvedilol (COREG) 3.125 mg tablet Take 1 Tab by mouth two (2) times daily (with meals). 16   Danny Dougherty MD   furosemide (LASIX) 20 mg tablet Take 1 Tab by mouth daily. 16   Danny Dougherty MD   oxyCODONE-acetaminophen (PERCOCET) 5-325 mg per tablet Take 1-2 Tabs by mouth every six (6) hours as needed. Max Daily Amount: 8 Tabs. Indications: PAIN 16   Danny Dougherty MD   Lactobacillus acidophilus (FLORAJEN) 460 mg (20 billion cell) cap Take 1 Cap by mouth daily. 16   Danny Dougherty MD   lisinopril (PRINIVIL, ZESTRIL) 10 mg tablet Take 10 mg by mouth daily. Historical Provider   aspirin delayed-release 81 mg tablet Take 81 mg by mouth daily. Historical Provider      Allergies   Allergen Reactions    Morphine Itching and Other (comments)     \"crazy\" per family       Review of Systems:  Review of systems not obtained due to patient factors. Objective:      Patient Vitals for the past 8 hrs:   BP Temp Pulse Resp SpO2 Height Weight   18 1530 (!) 123/99 - 93 19 97 % - -   18 1527 (!) 147/92 98.6 °F (37 °C) 90 20 97 % 5' 4\" (1.626 m) -       Temp (24hrs), Av.6 °F (37 °C), Min:98.6 °F (37 °C), Max:98.6 °F (37 °C)      Physical Exam:  GENERAL: alert, distracted, mild distress, appears older than stated age, THROAT & NECK: normal and no erythema or exudates noted. , LUNG: clear to auscultation bilaterally, HEART: regular rate and rhythm, S1, S2 normal, no murmur, click, rub or gallop, ABDOMEN: soft, non-tender. Bowel sounds normal. No masses,  no organomegaly, EXTREMITIES:  RLE with rubor of the entire RLE tenderness in the calf. Able to move toes and numbness in the toes. SKIN: Normal., NEUROLOGIC: negative, PSYCHIATRIC: non focal    Assessment:     59 y/o WM with acute RLE limb threatening ischemia    Plan:     - Will plan right femoral thrombectomy of occluded ABF limb with fasciotomies. Given complex history of prior vascular procedure will obtain a CTA of abdomen with runoff.  Will decide need for embolic workup after procedure, at minimum will need TTE and anticoagulation at discharge.     Signed By: José Antonio Naik MD     April 7, 2018

## 2018-04-07 NOTE — ANESTHESIA PREPROCEDURE EVALUATION
Anesthetic History   No history of anesthetic complications            Review of Systems / Medical History  Patient summary reviewed, nursing notes reviewed and pertinent labs reviewed    Pulmonary    COPD: mild      Smoker         Neuro/Psych   Within defined limits           Cardiovascular          CHF  Dysrhythmias   Pacemaker, CAD and PAD    Exercise tolerance: <4 METS  Comments: EF 15-20%   GI/Hepatic/Renal     GERD           Endo/Other  Within defined limits           Other Findings              Physical Exam    Airway  Mallampati: II  TM Distance: 4 - 6 cm  Neck ROM: normal range of motion   Mouth opening: Normal     Cardiovascular    Rhythm: regular  Rate: normal         Dental    Dentition: Edentulous     Pulmonary            Prolonged expiration     Abdominal  GI exam deferred       Other Findings            Anesthetic Plan    ASA: 4, emergent  Anesthesia type: general          Induction: Intravenous  Anesthetic plan and risks discussed with: Patient

## 2018-04-07 NOTE — ED PROVIDER NOTES
HPI Comments: 60 y.o. male with past medical history significant for GERD, COPD, CAD, PAD, CHF, ICD who presents from EMS with chief complaint of right leg pain. Pt states that he has 3 day onset of localized right leg pain. Pt denies CP, back pain, abd pain. Per family, pt experienced a fall 6 days ago and woke up the next morning unable to walk. He was not evaluated by a medical professional until today for lack of pulse in right leg that was discovered this morning. He was seen at [facility] ER before being transferred to [facility] ER. Family members report selecting this facility due to past SGHx done here. EMS administered fentanyl en route. EMS personnel note that pt has no pulse in his right LE, from the hip down to her toes. They observed no deformities, but states that his leg was cold to the touch. Pt's O2 was 98% on nasal cannula, and 97% on RA. There are no other acute medical concerns at this time. PCP: Senia Canas MD    Full history, physical exam, and ROS unable to be obtained due to:  critical illness and dementia. Note written by Kimo Cortes, as dictated by Tawana Streeter MD 3:21 PM      The history is provided by the patient, the EMS personnel and a relative. The history is limited by the condition of the patient. No  was used. Past Medical History:   Diagnosis Date    CAD (coronary artery disease) 12/10/2010    Chronic airway obstruction, not elsewhere classified 12/10/2010    Chronic systolic heart failure ([address] 12/10/2010    COPD (chronic obstructive pulmonary disease) (HCC)     GERD (gastroesophageal reflux disease)     ICD (implantable cardioverter-defibrillator) in place     Ischemic cardiomyopathy     PAD (peripheral artery disease) ([address]     Tobacco use disorder        History reviewed. No pertinent surgical history. History reviewed. No pertinent family history.     Social History     Social History    Marital status:  Spouse name: N/A    Number of children: N/A    Years of education: N/A     Occupational History    Not on file. Social History Main Topics    Smoking status: Current Every Day Smoker     Packs/day: 1.00     Years: 44.00     Types: Cigarettes    Smokeless tobacco: Never Used    Alcohol use Yes      Comment: rarely    Drug use: No    Sexual activity: Not on file     Other Topics Concern    Not on file     Social History Narrative         ALLERGIES: Morphine    Review of Systems   Unable to perform ROS: Dementia       Vitals:    04/07/18 1527   BP: (!) 147/92   Pulse: 90   Resp: 20   Temp: 98.6 °F (37 °C)   SpO2: 97%   Height: 5' 4\" (1.626 m)            Physical Exam   Constitutional: He appears well-developed and well-nourished. No distress. HENT:   Head: Normocephalic and atraumatic. Right Ear: External ear normal.   Left Ear: External ear normal.   Nose: Nose normal.   Mouth/Throat: Oropharynx is clear and moist.   Eyes: Conjunctivae and EOM are normal. Pupils are equal, round, and reactive to light. No scleral icterus. Neck: Normal range of motion. Neck supple. No JVD present. No tracheal deviation present. No thyromegaly present. Cardiovascular: Normal rate, regular rhythm and normal heart sounds. Exam reveals no gallop and no friction rub. No murmur heard. No right femoral pulse. Strong left femoral pulse. Pulmonary/Chest: Effort normal and breath sounds normal. No respiratory distress. He has no wheezes. He has no rales. He exhibits no tenderness. Abdominal: Soft. He exhibits no distension and no mass. Bowel sounds are decreased. There is no tenderness. There is no rebound and no guarding. Musculoskeletal: Normal range of motion. He exhibits no edema or tenderness. Lymphadenopathy:     He has no cervical adenopathy. Neurological: He is alert. He has normal strength. He displays no atrophy and no tremor. No cranial nerve deficit. He exhibits normal muscle tone. Coordination and gait normal.   Alert to hospital and name only. Moving all 4 extremities. Skin: Skin is dry. No rash noted. He is not diaphoretic. No erythema. Right leg cool to touch at knee level extending down to foot, involving all toes   Psychiatric:   Unable due to dementia. Nursing note and vitals reviewed. Note written by Kimo Feliz, as dictated by Darrius Dallas MD 3:21 PM      MDM  Number of Diagnoses or Management Options  Cold foot with peripheral vascular disease [facility]):   Diagnosis management comments: BRISSA  Impression: 41-year-old male with a long-standing history of cardiovascular disease status post four-vessel bypass surgery possibly his head some sort of bypass procedure to his left femoral now presents to the emergency department with a cold leg since sometime this morning. There is a history that he may have fallen at some point in time but family is unsure. He's had a previous right hip surgery. The patient has dementia and thus no history is obtainable from him. On examination there is no pulse the right femoral area the right leg is cool to the touch from the knee down to the foot including all toes. Differential includes possible aortic aneurysm, dissection, thrombus level to be determined. Plan of care we baseline labs, IV fluid hydration, he'll have a CT scan of the abdomen and pelvis with contrast with an arterial runoff apart he contacted vascular surgery and await their return call. Critical Care  Total time providing critical care: (Total critical care time spend exclusive of procedures: 45 minutes  )        ED Course       Procedures    ED EKG interpretation:  Rhythm: normal sinus rhythm; and regular . Rate (approx.): 89; Axis: left axis deviation; Right bundle branch block. T-wave abnormality, repolarization changes.     Note written by Kimo Feliz, as dictated by Darrius Dallas MD 3:39 PM    CONSULT NOTE:  4:19 PM Darrius Dallas MD spoke with Dr. Latha Funes, Consult for Vascular surgery. Discussed available diagnostic tests and clinical findings.

## 2018-04-07 NOTE — ED NOTES
Attempted report to OR. Dr Corey Shelton at bedside to transport pt to STAT surgery. Pre-op checklist completed.  Dr. Corey Shelton aware chg bath has not been performed and did not want to delay surgery for it to be completed in ED

## 2018-04-07 NOTE — PROGRESS NOTES
Admission Medication Reconciliation:    Information obtained from:  Patient's daughter, RxQuery/chart review    Comments/Recommendations: Updated PTA meds/reviewed patient's allergies. 1)  Medications added: multivitamin    2)  Medications deleted: APAP 325 mg, aspirin 81 mg, carvedilol 3.125 mg, furosemide 20 mg, probiotics    3)  Medications changed: none       Significant PMH/Disease States:   Past Medical History:   Diagnosis Date    CAD (coronary artery disease) 12/10/2010    Chronic airway obstruction, not elsewhere classified 12/10/2010    Chronic systolic heart failure ([address] 12/10/2010    COPD [facility] obstructive pulmonary disease) ([facility])     GERD (gastroesophageal reflux disease)     ICD (implantable cardioverter-defibrillator) in place     Ischemic cardiomyopathy     PAD (peripheral artery disease) ([address]     Tobacco use disorder        Chief Complaint for this Admission:    Chief Complaint   Patient presents with    Circulatory problem       Allergies:  Morphine    Prior to Admission Medications:   Prior to Admission Medications   Prescriptions Last Dose Informant Patient Reported? Taking?   lisinopril (PRINIVIL, ZESTRIL) 10 mg tablet 4/7/2018 at AM  Yes Yes   Sig: Take 10 mg by mouth daily. oxyCODONE-acetaminophen (PERCOCET) 5-325 mg per tablet   No Yes   Sig: Take 1-2 Tabs by mouth every six (6) hours as needed. Max Daily Amount: 8 Tabs. Indications: PAIN   therapeutic multivitamin (THERAGRAN) tablet 4/7/2018 at AM  Yes Yes   Sig: Take 1 Tab by mouth daily. Facility-Administered Medications: None     Thank you for allowing me to participate in the care of this patient. Please contact the medication reconciliation pharmacist () with any questions.       Ana Trinidad, PharmD Candidate [id number]

## 2018-04-08 LAB
APTT PPP: 64.7 SEC (ref 22.1–32)
APTT PPP: 64.8 SEC (ref 22.1–32)
APTT PPP: 84.3 SEC (ref 22.1–32)
ATRIAL RATE: 89 BPM
CALCULATED P AXIS, ECG09: 71 DEGREES
CALCULATED R AXIS, ECG10: -49 DEGREES
CALCULATED T AXIS, ECG11: 75 DEGREES
DIAGNOSIS, 93000: NORMAL
ERYTHROCYTE [DISTWIDTH] IN BLOOD BY AUTOMATED COUNT: 13 % (ref 11.5–14.5)
HCT VFR BLD AUTO: 36.8 % (ref 36.6–50.3)
HGB BLD-MCNC: 12.4 G/DL (ref 12.1–17)
INR PPP: 1.1 (ref 0.9–1.1)
MCH RBC QN AUTO: 32.1 PG (ref 26–34)
MCHC RBC AUTO-ENTMCNC: 33.7 G/DL (ref 30–36.5)
MCV RBC AUTO: 95.3 FL (ref 80–99)
NRBC # BLD: 0 K/UL (ref 0–0.01)
NRBC BLD-RTO: 0 PER 100 WBC
P-R INTERVAL, ECG05: 134 MS
PLATELET # BLD AUTO: 125 K/UL (ref 150–400)
PMV BLD AUTO: 10.9 FL (ref 8.9–12.9)
PROTHROMBIN TIME: 11.2 SEC (ref 9–11.1)
Q-T INTERVAL, ECG07: 442 MS
QRS DURATION, ECG06: 140 MS
QTC CALCULATION (BEZET), ECG08: 537 MS
RBC # BLD AUTO: 3.86 M/UL (ref 4.1–5.7)
THERAPEUTIC RANGE,PTTT: ABNORMAL SECS (ref 58–77)
VENTRICULAR RATE, ECG03: 89 BPM
WBC # BLD AUTO: 16.8 K/UL (ref 4.1–11.1)

## 2018-04-08 PROCEDURE — 85027 COMPLETE CBC AUTOMATED: CPT | Performed by: SURGERY

## 2018-04-08 PROCEDURE — 74011250637 HC RX REV CODE- 250/637: Performed by: SPECIALIST

## 2018-04-08 PROCEDURE — 74011250636 HC RX REV CODE- 250/636: Performed by: SURGERY

## 2018-04-08 PROCEDURE — 85730 THROMBOPLASTIN TIME PARTIAL: CPT | Performed by: SURGERY

## 2018-04-08 PROCEDURE — 36415 COLL VENOUS BLD VENIPUNCTURE: CPT | Performed by: SURGERY

## 2018-04-08 PROCEDURE — 85610 PROTHROMBIN TIME: CPT | Performed by: SURGERY

## 2018-04-08 PROCEDURE — 74011250637 HC RX REV CODE- 250/637: Performed by: SURGERY

## 2018-04-08 PROCEDURE — 65660000000 HC RM CCU STEPDOWN

## 2018-04-08 PROCEDURE — 74011250636 HC RX REV CODE- 250/636: Performed by: ANESTHESIOLOGY

## 2018-04-08 RX ORDER — HYDROMORPHONE HYDROCHLORIDE 2 MG/ML
0.5 INJECTION, SOLUTION INTRAMUSCULAR; INTRAVENOUS; SUBCUTANEOUS ONCE
Status: COMPLETED | OUTPATIENT
Start: 2018-04-08 | End: 2018-04-08

## 2018-04-08 RX ORDER — LISINOPRIL 5 MG/1
2.5 TABLET ORAL DAILY
Status: DISCONTINUED | OUTPATIENT
Start: 2018-04-09 | End: 2018-04-12 | Stop reason: HOSPADM

## 2018-04-08 RX ORDER — ALBUTEROL SULFATE 0.83 MG/ML
2.5 SOLUTION RESPIRATORY (INHALATION)
Status: DISCONTINUED | OUTPATIENT
Start: 2018-04-08 | End: 2018-04-08

## 2018-04-08 RX ORDER — HYDROCODONE BITARTRATE AND ACETAMINOPHEN 5; 325 MG/1; MG/1
1 TABLET ORAL
Status: COMPLETED | OUTPATIENT
Start: 2018-04-08 | End: 2018-04-08

## 2018-04-08 RX ORDER — HYDROCODONE BITARTRATE AND ACETAMINOPHEN 5; 325 MG/1; MG/1
1 TABLET ORAL
Status: DISCONTINUED | OUTPATIENT
Start: 2018-04-08 | End: 2018-04-08

## 2018-04-08 RX ORDER — HYDROCODONE BITARTRATE AND ACETAMINOPHEN 7.5; 325 MG/1; MG/1
1 TABLET ORAL
Status: DISCONTINUED | OUTPATIENT
Start: 2018-04-08 | End: 2018-04-12 | Stop reason: HOSPADM

## 2018-04-08 RX ORDER — GUAIFENESIN 100 MG/5ML
81 LIQUID (ML) ORAL DAILY
Status: DISCONTINUED | OUTPATIENT
Start: 2018-04-08 | End: 2018-04-12 | Stop reason: HOSPADM

## 2018-04-08 RX ORDER — CARVEDILOL 3.12 MG/1
3.12 TABLET ORAL 2 TIMES DAILY WITH MEALS
Status: DISCONTINUED | OUTPATIENT
Start: 2018-04-08 | End: 2018-04-12 | Stop reason: HOSPADM

## 2018-04-08 RX ORDER — ALBUTEROL SULFATE 0.83 MG/ML
2.5 SOLUTION RESPIRATORY (INHALATION)
Status: DISCONTINUED | OUTPATIENT
Start: 2018-04-08 | End: 2018-04-12 | Stop reason: HOSPADM

## 2018-04-08 RX ADMIN — SODIUM CHLORIDE, SODIUM LACTATE, POTASSIUM CHLORIDE, AND CALCIUM CHLORIDE 100 ML/HR: 600; 310; 30; 20 INJECTION, SOLUTION INTRAVENOUS at 04:39

## 2018-04-08 RX ADMIN — HYDROCODONE BITARTRATE AND ACETAMINOPHEN 1 TABLET: 7.5; 325 TABLET ORAL at 20:08

## 2018-04-08 RX ADMIN — HYDROMORPHONE HYDROCHLORIDE 0.5 MG: 2 INJECTION, SOLUTION INTRAMUSCULAR; INTRAVENOUS; SUBCUTANEOUS at 10:22

## 2018-04-08 RX ADMIN — HYDROCODONE BITARTRATE AND ACETAMINOPHEN 1 TABLET: 5; 325 TABLET ORAL at 10:24

## 2018-04-08 RX ADMIN — HYDROCODONE BITARTRATE AND ACETAMINOPHEN 1 TABLET: 5; 325 TABLET ORAL at 09:21

## 2018-04-08 RX ADMIN — CARVEDILOL 3.12 MG: 3.12 TABLET, FILM COATED ORAL at 17:01

## 2018-04-08 RX ADMIN — FENTANYL CITRATE 25 MCG: 50 INJECTION, SOLUTION INTRAMUSCULAR; INTRAVENOUS at 01:23

## 2018-04-08 RX ADMIN — HYDROMORPHONE HYDROCHLORIDE 0.5 MG: 2 INJECTION INTRAMUSCULAR; INTRAVENOUS; SUBCUTANEOUS at 09:56

## 2018-04-08 RX ADMIN — ONDANSETRON 4 MG: 2 INJECTION INTRAMUSCULAR; INTRAVENOUS at 10:43

## 2018-04-08 RX ADMIN — Medication 10 ML: at 22:00

## 2018-04-08 RX ADMIN — HEPARIN SODIUM AND DEXTROSE 16 UNITS/KG/HR: 10000; 5 INJECTION INTRAVENOUS at 20:10

## 2018-04-08 RX ADMIN — SODIUM CHLORIDE 100 ML/HR: 900 INJECTION, SOLUTION INTRAVENOUS at 14:52

## 2018-04-08 RX ADMIN — ASPIRIN 81 MG 81 MG: 81 TABLET ORAL at 09:49

## 2018-04-08 NOTE — PROGRESS NOTES
55 Patient daughter called very upset. She reported she just spoke to her father and he was in pain and nauseated and no one is treating him. I explained I was in recently in the room and patient stated his leg only hurts when he moves it, that is why no one gave him pain medication. When I completed my sentence I noticed there was no one on the phone. I went to check on the patient he reported he had pain in the penis, he had to go to the restroom. I reminded him there was a ga in place and asked him to try to pee. He reported that felt better as urine filled the catheter tubing. I then asked him if he felt like he was sick. He said \"NO\".

## 2018-04-08 NOTE — CONSULTS
Date of  Admission: 4/7/2018  3:16 PM     Clarence Castillo is a 60 y.o. male admitted for right femoral clot; Ischemia of right lower extremity  Subjective: 60 y.o. male with past medical history significant for dementia, GERD, COPD, CAD CABG in 2005 , PAD, CHF, ICD who presented from EMS with chief complaint of right leg pain. Pt stated that he has 3 day onset of localized right leg pain. Pt denies CP, back pain, abd pain. He is now s/p THROMBECTOMY/EMBOLECTOMY  RIGHT LOWER EXTREMITY WITH INTRAOPERATIVE ANGIOGRAM, ILIAC STENT, FASCIOTOMY  Echo on 12/ 2016 with EF 15-20%  denies chest pain, chest pressure/discomfort, dyspnea, palpitations, near-syncope, syncope. Cardiac risk factors: smoking/ tobacco exposure, dyslipidemia, sedentary life style, male gender, hypertension.     Assessment/Plan: ECG with NSR RBBB and st and LT  No clear cardiac symptoms at this time  Proceed with echo  Continue lisinopril asa and add coreg for now  Not sure about compliance with medications on account of dementia  No clinical evidence for decompensation at this time    Patient Active Problem List    Diagnosis Date Noted    Ischemia of right lower extremity 04/07/2018    Gangrene of foot ([address] 12/12/2016    AICD at end of battery life 03/10/2015    Paroxysmal ventricular tachycardia ([address] 06/01/2011    Tobacco use disorder     CAD (coronary artery disease) 12/10/2010    Chronic systolic heart failure ([address] 12/10/2010    Chronic airway obstruction, not elsewhere classified     PAD (peripheral artery disease) ([address]     Ischemic cardiomyopathy       Martha Albert MD  Past Medical History:   Diagnosis Date    CAD (coronary artery disease) 12/10/2010    Chronic airway obstruction, not elsewhere classified 12/10/2010    Chronic systolic heart failure ([address] 12/10/2010    COPD (chronic obstructive pulmonary disease) ([address]     GERD (gastroesophageal reflux disease)     ICD (implantable cardioverter-defibrillator) in place     Ischemic cardiomyopathy     [facility] (per[facility] disease) [address]     Tobacco use disorder     History reviewed. No pertinent surgical history. History reviewed. No pertinent surgical history. Allergies   Allergen Reactions    Codeine Nausea and Vomiting and Other (comments)    Morphine Itching and Other (comments)     \"crazy\" per family      History reviewed. No pertinent family history. Current Facility-Administered Medications   Medication Dose Route Frequency    aspirin chewable tablet 81 mg  81 mg Oral DAILY    albuterol (PROVENTIL VENTOLIN) nebulizer solution 2.5 mg  2.5 mg Nebulization Q4H PRN    HYDROcodone-acetaminophen (NORCO) 7.5-325 mg per tablet 1 Tab  1 Tab Oral Q4H PRN    lactated Ringers infusion  100 mL/hr IntraVENous CONTINUOUS    sodium chloride (NS) flush 5-10 mL  5-10 mL IntraVENous PRN    ondansetron (ZOFRAN) injection 4 mg  4 mg IntraVENous PRN    midazolam (VERSED) injection 0.5 mg  0.5 mg IntraVENous Q5MIN PRN    HYDROmorphone (DILAUDID) injection 0.2 mg  0.2 mg IntraVENous Q1H PRN    0.9% sodium chloride infusion  100 mL/hr IntraVENous CONTINUOUS    sodium chloride (NS) flush 5-10 mL  5-10 mL IntraVENous Q8H    sodium chloride (NS) flush 5-10 mL  5-10 mL IntraVENous PRN    heparin 25,000 units in D5W 250 ml infusion  18-36 Units/kg/hr IntraVENous TITRATE         Review of Symptoms:  Pertinent items are noted in HPI. Physical Exam    Visit Vitals    /58    Pulse 65    Temp 98.8 °F (37.1 °C)    Resp 20    Ht 5' 4\" (1.626 m)    Wt 65 kg (143 lb 4.8 oz)    SpO2 100%    BMI 24.6 kg/m2     Neck: no JVD  Heart: regular rate and rhythm  Lungs: diminished breath sounds R anterior, L anterior  Abdomen: soft, non-tender.  Bowel sounds normal. No masses,  no organomegaly  Extremities: no edema    Cardiographics    Telemetry: normal sinus rhythm  ECG: normal sinus rhythm, nonspecific ST and T waves changes, RBBB  Echocardiogram: Not done    Recent radiology, intake/output and wt reviewed    Labs:   Recent Results (from the past 24 hour(s))   CBC WITH AUTOMATED DIFF    Collection Time: 04/07/18  3:34 PM   Result Value Ref Range    WBC 15.5 (H) 4.1 - 11.1 K/uL    RBC 4.93 4.10 - 5.70 M/uL    HGB 16.0 12.1 - 17.0 g/dL    HCT 47.3 36.6 - 50.3 %    MCV 95.9 80.0 - 99.0 FL    MCH 32.5 26.0 - 34.0 PG    MCHC 33.8 30.0 - 36.5 g/dL    RDW 13.1 11.5 - 14.5 %    PLATELET 102 (L) 831 - 400 K/uL    MPV 10.8 8.9 - 12.9 FL    NRBC 0.0 0  WBC    ABSOLUTE NRBC 0.00 0.00 - 0.01 K/uL    NEUTROPHILS 76 (H) 32 - 75 %    LYMPHOCYTES 13 12 - 49 %    MONOCYTES 10 5 - 13 %    EOSINOPHILS 1 0 - 7 %    BASOPHILS 0 0 - 1 %    IMMATURE GRANULOCYTES 1 (H) 0.0 - 0.5 %    ABS. NEUTROPHILS 11.7 (H) 1.8 - 8.0 K/UL    ABS. LYMPHOCYTES 1.9 0.8 - 3.5 K/UL    ABS. MONOCYTES 1.5 (H) 0.0 - 1.0 K/UL    ABS. EOSINOPHILS 0.1 0.0 - 0.4 K/UL    ABS. BASOPHILS 0.1 0.0 - 0.1 K/UL    ABS. IMM. GRANS. 0.2 (H) 0.00 - 0.04 K/UL    DF AUTOMATED     METABOLIC PANEL, COMPREHENSIVE    Collection Time: 04/07/18  3:34 PM   Result Value Ref Range    Sodium 138 136 - 145 mmol/L    Potassium 3.4 (L) 3.5 - 5.1 mmol/L    Chloride 104 97 - 108 mmol/L    CO2 27 21 - 32 mmol/L    Anion gap 7 5 - 15 mmol/L    Glucose 74 65 - 100 mg/dL    BUN 7 6 - 20 MG/DL    Creatinine 0.55 (L) 0.70 - 1.30 MG/DL    BUN/Creatinine ratio 13 12 - 20      GFR est AA >60 >60 ml/min/1.73m2    GFR est non-AA >60 >60 ml/min/1.73m2    Calcium 8.7 8.5 - 10.1 MG/DL    Bilirubin, total 0.9 0.2 - 1.0 MG/DL    ALT (SGPT) 26 12 - 78 U/L    AST (SGOT) 43 (H) 15 - 37 U/L    Alk.  phosphatase 131 (H) 45 - 117 U/L    Protein, total 7.7 6.4 - 8.2 g/dL    Albumin 2.9 (L) 3.5 - 5.0 g/dL    Globulin 4.8 (H) 2.0 - 4.0 g/dL    A-G Ratio 0.6 (L) 1.1 - 2.2     PROTHROMBIN TIME + INR    Collection Time: 04/07/18  3:34 PM   Result Value Ref Range    INR 1.1 0.9 - 1.1      Prothrombin time 11.7 (H) 9.0 - 11.1 sec   EKG, 12 LEAD, INITIAL    Collection Time: 04/07/18  3:39 PM   Result Value Ref Range    Ventricular Rate 89 BPM    Atrial Rate 89 BPM    P-R Interval 134 ms    QRS Duration 140 ms    Q-T Interval 442 ms    QTC Calculation (Bezet) 537 ms    Calculated P Axis 71 degrees    Calculated R Axis -49 degrees    Calculated T Axis 75 degrees    Diagnosis       Normal sinus rhythm  Left axis deviation  Right bundle branch block  Anteroseptal infarct (cited on or before 14-JAN-2011)  T wave abnormality, consider lateral ischemia  When compared with ECG of 13-DEC-2016 07:54,  premature ventricular complexes are no longer present  T wave inversion less evident in Lateral leads     PTT    Collection Time: 04/07/18  3:41 PM   Result Value Ref Range    aPTT 86.1 (HH) 22.1 - 32.0 sec    aPTT, therapeutic range     58.0 - 77.0 SECS   LACTIC ACID    Collection Time: 04/07/18  3:41 PM   Result Value Ref Range    Lactic acid 1.7 0.4 - 2.0 MMOL/L   GLUCOSE, POC    Collection Time: 04/07/18  6:20 PM   Result Value Ref Range    Glucose (POC) 85 65 - 100 mg/dL    Performed by Feli Rojas    METABOLIC PANEL, BASIC    Collection Time: 04/07/18  9:53 PM   Result Value Ref Range    Sodium 139 136 - 145 mmol/L    Potassium 3.3 (L) 3.5 - 5.1 mmol/L    Chloride 107 97 - 108 mmol/L    CO2 23 21 - 32 mmol/L    Anion gap 9 5 - 15 mmol/L    Glucose 140 (H) 65 - 100 mg/dL    BUN 5 (L) 6 - 20 MG/DL    Creatinine 0.53 (L) 0.70 - 1.30 MG/DL    BUN/Creatinine ratio 9 (L) 12 - 20      GFR est AA >60 >60 ml/min/1.73m2    GFR est non-AA >60 >60 ml/min/1.73m2    Calcium 7.5 (L) 8.5 - 10.1 MG/DL   CBC WITH AUTOMATED DIFF    Collection Time: 04/07/18 10:28 PM   Result Value Ref Range    WBC 14.7 (H) 4.1 - 11.1 K/uL    RBC 4.16 4.10 - 5.70 M/uL    HGB 13.4 12.1 - 17.0 g/dL    HCT 39.9 36.6 - 50.3 %    MCV 95.9 80.0 - 99.0 FL    MCH 32.2 26.0 - 34.0 PG    MCHC 33.6 30.0 - 36.5 g/dL    RDW 12.9 11.5 - 14.5 %    PLATELET 602 (L) 237 - 400 K/uL    MPV 10.9 8.9 - 12.9 FL    NRBC 0.0 0  WBC    ABSOLUTE NRBC 0.00 0.00 - 0.01 K/uL    NEUTROPHILS 94 (H) 32 - 75 %    LYMPHOCYTES 3 (L) 12 - 49 %    MONOCYTES 2 (L) 5 - 13 %    EOSINOPHILS 0 0 - 7 %    BASOPHILS 0 0 - 1 %    IMMATURE GRANULOCYTES 1 (H) 0.0 - 0.5 %    ABS. NEUTROPHILS 13.9 (H) 1.8 - 8.0 K/UL    ABS. LYMPHOCYTES 0.4 (L) 0.8 - 3.5 K/UL    ABS. MONOCYTES 0.3 0.0 - 1.0 K/UL    ABS. EOSINOPHILS 0.0 0.0 - 0.4 K/UL    ABS. BASOPHILS 0.0 0.0 - 0.1 K/UL    ABS. IMM.  GRANS. 0.1 (H) 0.00 - 0.04 K/UL    DF SMEAR SCANNED      RBC COMMENTS MACROCYTOSIS  1+       CBC W/O DIFF    Collection Time: 04/08/18  4:28 AM   Result Value Ref Range    WBC 16.8 (H) 4.1 - 11.1 K/uL    RBC 3.86 (L) 4.10 - 5.70 M/uL    HGB 12.4 12.1 - 17.0 g/dL    HCT 36.8 36.6 - 50.3 %    MCV 95.3 80.0 - 99.0 FL    MCH 32.1 26.0 - 34.0 PG    MCHC 33.7 30.0 - 36.5 g/dL    RDW 13.0 11.5 - 14.5 %    PLATELET 185 (L) 526 - 400 K/uL    MPV 10.9 8.9 - 12.9 FL    NRBC 0.0 0  WBC    ABSOLUTE NRBC 0.00 0.00 - 0.01 K/uL   PROTHROMBIN TIME + INR    Collection Time: 04/08/18  4:28 AM   Result Value Ref Range    INR 1.1 0.9 - 1.1      Prothrombin time 11.2 (H) 9.0 - 11.1 sec   PTT    Collection Time: 04/08/18  4:28 AM   Result Value Ref Range    aPTT 84.3 (H) 22.1 - 32.0 sec    aPTT, therapeutic range     58.0 - 77.0 SECS

## 2018-04-08 NOTE — OP NOTES
[facility]  ACUTE CARE OP NOTE    Sherry León  MR#: [id number]  : 1955  ACCOUNT #: [de-identified]   DATE OF SERVICE: 2018    PREOPERATIVE DIAGNOSIS:  Acute right lower extremity ischemia. POSTOPERATIVE DIAGNOSIS:  Acute right lower extremity ischemia. PROCEDURE:  1.  Redo right groin dissection. 2.  Right iliofemoral thrombectomy and patch angioplasty. 3.  Aortogram with catheter placement in the abdominal aorta. 4.  Angioplasty and stenting of right aortobifemoral limb with a covered stent. 5.  Right lower extremity 4 compartment fasciotomies. SURGEON:  Raquel Reardon MD.    ASSISTANT:  None. ANESTHESIA:  General.    ESTIMATED BLOOD LOSS:  700 mL. SPECIMEN:  Right femoral thrombus. COMPLICATIONS:  None apparent. IMPLANTS:  None. OPERATIVE INDICATIONS:  The patient is a 76-year-old gentleman with early onset dementia; however, lives independently with his daughter who presented with one week of acute right lower extremity ischemia. He recently presented to an outside hospital and was worked up for hip pain, but he continued to be unable to walk and represented to same outside hospital.  It was noted he had no signals below the groin and he was transferred to [address] [place] [address] for further evaluation. He has significant history of aortobifemoral, failed iliac stents and failed fem-fem. On exam, he had severe right lower extremity pain, was able to wiggle his toes, but had significant numbness and pain in his leg. He had significant calf fullness as well. Therefore, I recommended proceeding with emergent right lower extremity thrombectomy. Prior to the procedure, the nature of the procedure as well as the risks and benefits were explained to the patient in detail. OPERATIVE DETAILS:  The patient was identified in the preop holding area. His right lower extremity was marked and consents were signed by the patient's family.   Patient was brought back to the operating room where general anesthesia was induced. The bilateral groins and right leg was then prepped and draped in usual standard fashion. A timeout was then performed. We began the procedure by using the lateral side of his previous vertical incision given how skinny he was. I then used electrocautery to dissect down these to identify the aortobifemoral limb and his fem-fem graft that was chronically occluded. These were dissected free and then I carried my dissection with lots of difficulty through all the scar to identify the superficial femoral artery. This is a nice vessel that was soft. My dissection was then carried down to the profunda were I obtained below previous dissection and carried this back to the common femoral artery. This is dissected free and looped for control. The profunda was also a soft vessel. Once I disconnected the fem-fem graft to mobilize the right aortobifemoral limb, this was oversewn and allowed to retract. I then gave the patient 5000 units of IV heparin. After waiting approximately 3 minutes, all the vessels were clamped. An 11 blade was used to make an arteriotomy in the aortobifemoral limb. This was extended down onto the profunda. I then noted a fresh thrombus sitting in the common femoral artery as well as some chronic appearing thrombus in the dilated dumas of the graft. This was all removed. I then could visualize the lumens at the bifurcation and when I released my clamps, I had backbleeding from the SFA and the profunda. Once I was satisfied that I had cleared the thrombus in the SFA and profunda, I then focused on the inflow. I first started with a #5 Fogerty and passed this as proximal as I could. I was unable to pass this all the way up and could only pass it approximately 60-70 mm and I did retrieve fresh thrombus. I was unable to pass a #5 Alyssa, it was only just kinking over inside the graft.   I could not get it into the level of aorta. I tried several balloons and was unable to pass this. Therefore, I brought on the field an 11-Turkish sheath and then attempted to cross utilizing an over-the-wire balloon. I had some difficulty crossing it, but, however, with a Ramirez catheter and a Glidewire, I was able to navigate the kink of the proximal aortobifemoral limb and passed my catheter into the aorta. Aortogram performed. Patency into the aorta with good flow into the left iliac system. I then placed an Amplatz wire to stabilize my access into the aorta. I then brought on the field a #5 over-the-wire Alyssa and performed thrombectomy of the entire limb. I did get some inflow; however, I was unsatisfied with the pulse at the groin. Therefore, I realized that this kink now was preventing the inflow into my groin. Therefore, I elected to partially close my graftotomy and brought in the field a graft Hemashield patch. This was sewn in a standard fashion with 5-0 Prolene. I did not close this. I then left a gap so I could place my 11-Turkish sheath through the hole. I then shot multiple angiograms through my sheath to identify what was going on with the inflow. Again, this identified a significant kink with probably some chronic appearing thrombus. I brought on the field a 10 x 40 mm balloon and ballooned the graft. This did improve the appearance of the angiogram and I used a 10 mm balloon as a Fogerty catheter and did remove some chronic appearing thrombus; however, I still did not have satisfying inflow. Therefore, I realized that this kink was preventing inflow, so I brought on the field a 10 x 37 mm iCAST stent. This was placed after my screen was marked from an angiogram and placed to balloon out the kinked graft. Angiogram showed an excellent result with no filling defects and I had a great pulse in the groin. I am satisfied now with my inflow.   I then copiously flushed the groins and irrigated my wound. I then removed the sheath and wire and closed my arteriotomy site. Next, I listened with a Doppler signal over the graft and had an excellent Doppler signal in the SFA and profunda with diastolic flow. My suture line was hemostatic. Then given his acute symptoms and time of length of his ischemia, I elected to proceed to perform prophylactic fasciotomies. I performed a 5 cm incision of the medial calf opening the posterior compartment. These muscles were healthy appearing and not bulging. In a similar fashion on the lateral side of the leg, I made a 5 cm incision overlying the anterior and lateral compartments. There was diffuse edema in this area. The muscles were bulging somewhat in the lateral compartment. I used a Metzenbaum scissors to perform a fasciotomy of the lateral anterior compartment, passing my Metzenbaums down the leg proximally and distally through my 5 cm incision. Next, I listened with a Doppler signal over the posterior tibial artery at the ankle and there is an excellent Doppler signal.  Satisfied with my results, I then copiously irrigated the wound and closed the groin with multiple layers of Vicryl and stapled the skin closed. The fasciotomy sites were packed and left open. The patient was awoke from anesthesia in stable condition. At the completion of the procedure, all needle, sponge, and instrument counts were correct x2.       MD JUSTINE Mcclain / JUAN ALBERTO  D: 04/07/2018 22:23     T: 04/07/2018 23:13  JOB #: [id number]

## 2018-04-08 NOTE — PROGRESS NOTES
Pt family member called to speak with RN Rody Campos, stated \"pt was not getting appropriate care from RN\". Jose was in an isolation room and could not come to the phone, which I told the family member. I asked for name and number to call back and family member hung up.

## 2018-04-08 NOTE — ANESTHESIA POSTPROCEDURE EVALUATION
Post-Anesthesia Evaluation and Assessment    Patient: Sheri Sevilla MRN: [id number]  SSN: [id number]    YOB: 1955  Age: 60 y.o. Sex: male       Cardiovascular Function/Vital Signs  Visit Vitals    /70    Pulse 92    Temp 36.8 °C (98.3 °F)    Resp 16    Ht 5' 4\" (1.626 m)    Wt 65 kg (143 lb 4.8 oz)    SpO2 100%    BMI 24.6 kg/m2       Patient is status post general anesthesia for Procedure(s):  THROMBECTOMY/EMBOLECTOMY  RIGHT LOWER EXTREMITY WITH INTRAOPERATIVE ANGIOGRAM, ILIAC STENT, FASCIOTOMY. Nausea/Vomiting: None    Postoperative hydration reviewed and adequate. Pain:  Pain Scale 1: Numeric (0 - 10) (04/07/18 2146)  Pain Intensity 1: 0 (04/07/18 2146)   Managed    Neurological Status:   Neuro (WDL): Within Defined Limits (04/07/18 2146)  Neuro  Neurologic State: Alert;Eyes open spontaneously (04/07/18 1532)  Orientation Level: Oriented to person;Disoriented to place; Disoriented to situation;Disoriented to time (04/07/18 1532)  Cognition: Decreased command following (04/07/18 1532)  Speech: Clear (04/07/18 1532)   At baseline    Mental Status and Level of Consciousness: Arousable    Pulmonary Status:   O2 Device: Nasal cannula (04/07/18 2146)   Adequate oxygenation and airway patent    Complications related to anesthesia: None    Post-anesthesia assessment completed.  No concerns    Signed By: Gatito Major MD     April 7, 2018

## 2018-04-08 NOTE — BRIEF OP NOTE
BRIEF OPERATIVE NOTE    Date of Procedure: 4/7/2018   Preoperative Diagnosis: right femoral clot  Postoperative Diagnosis: right femoral clot    Procedure(s):  THROMBECTOMY/EMBOLECTOMY  RIGHT LOWER EXTREMITY WITH INTRAOPERATIVE ANGIOGRAM, ILIAC STENT, FASCIOTOMY  Surgeon(s) and Role:     * Shayla Mendoza MD - Primary  Assistant Staff: None  Surgical Staff:  Circ-1: Nadeen Juarez RN  Circ-2: Henry Fajardo RN  Circ-Relief: Veto Young RN  Radiology Technician: Dieudonne Bolaños RT  Registered Nurse Assistant: Owen Sullivan RN  Scrub Tech-Relief: Aida Cerrato  Scrub RN-1: Elena Vicente RN  Event Time In   Incision Start 1856   Incision Close 2123     Anesthesia: General   Estimated Blood Loss: 750  Specimens:   ID Type Source Tests Collected by Time Destination   1 : RIGHT FEMORAL CLOT Preservative Groin  Shayla Mendoza MD 4/7/2018 1923 Pathology      Findings: Scarred groin, ABF is kinked proximally with inadequate inflow- placed a covered stent. PT signal at completion. Complications: None apparent  Implants:   Implant Name Type Inv.  Item Serial No.  Lot No. LRB No. Used Action   PATCH VASC PLAT FINESSE [id number] --  - [id number]  PATCH VASC PLAT FINESSE 8X76MM --  [id number] [employer] CARDIOVASCLR [id number] Right 1 Implanted   STENT TRACH BLLN [id number] -- ICAST CVR - [id number] Stent STENT [facility] BLLN [id number] -- ICAST CVR   [employer] CARDI[employer]R [id number] Right 1 Implanted

## 2018-04-08 NOTE — PROGRESS NOTES
Looks good, incisional pain and foot warm  Visit Vitals    /58    Pulse 65    Temp 98.8 °F (37.1 °C)    Resp 20    Ht 5' 4\" (1.626 m)    Wt 65 kg (143 lb 4.8 oz)    SpO2 100%    BMI 24.6 kg/m2     Awake  Right groin incision flat and soft, dressed  RLE warm and dry, good PT signal  Fasciotomy sites dressed    59 y/o WM with acute RLE ischemia with an occluded right ABF limb, s/p thrombectomy  - Foot nice and warm, rest pain gone and M/S intact. Continue heparin gtt, bedrest, ga(difficult placement). Monitored bed transfer. - Will ask cards to see given his CHF and obtain a TTE. He does have a poor EF and may need to be on anticoagulation for heart? The only defect in the OR was a signficant kink in the proximal limb of the graft which may have lead to a mechanical occlusion which is now stented(Though this graft is 8years old?). He is a fall risk so will have to decide about anticoagulation in the longterm. I started baby aspirin which he was not taking.

## 2018-04-09 PROCEDURE — 93306 TTE W/DOPPLER COMPLETE: CPT

## 2018-04-09 PROCEDURE — 65660000000 HC RM CCU STEPDOWN

## 2018-04-09 PROCEDURE — 97535 SELF CARE MNGMENT TRAINING: CPT

## 2018-04-09 PROCEDURE — 93306 TTE W/DOPPLER COMPLETE: CPT | Performed by: SURGERY

## 2018-04-09 PROCEDURE — 97165 OT EVAL LOW COMPLEX 30 MIN: CPT

## 2018-04-09 PROCEDURE — G8987 SELF CARE CURRENT STATUS: HCPCS

## 2018-04-09 PROCEDURE — 74011250637 HC RX REV CODE- 250/637: Performed by: SURGERY

## 2018-04-09 PROCEDURE — 74011250637 HC RX REV CODE- 250/637: Performed by: SPECIALIST

## 2018-04-09 PROCEDURE — G8978 MOBILITY CURRENT STATUS: HCPCS

## 2018-04-09 PROCEDURE — 97161 PT EVAL LOW COMPLEX 20 MIN: CPT

## 2018-04-09 PROCEDURE — 97530 THERAPEUTIC ACTIVITIES: CPT

## 2018-04-09 PROCEDURE — G8979 MOBILITY GOAL STATUS: HCPCS

## 2018-04-09 PROCEDURE — G8988 SELF CARE GOAL STATUS: HCPCS

## 2018-04-09 RX ADMIN — HYDROCODONE BITARTRATE AND ACETAMINOPHEN 1 TABLET: 7.5; 325 TABLET ORAL at 09:01

## 2018-04-09 RX ADMIN — APIXABAN 5 MG: 5 TABLET, FILM COATED ORAL at 17:37

## 2018-04-09 RX ADMIN — Medication 10 ML: at 06:00

## 2018-04-09 RX ADMIN — Medication 10 ML: at 12:55

## 2018-04-09 RX ADMIN — ASPIRIN 81 MG 81 MG: 81 TABLET ORAL at 08:58

## 2018-04-09 RX ADMIN — HYDROCODONE BITARTRATE AND ACETAMINOPHEN 1 TABLET: 7.5; 325 TABLET ORAL at 04:49

## 2018-04-09 RX ADMIN — Medication 10 ML: at 14:00

## 2018-04-09 RX ADMIN — HYDROCODONE BITARTRATE AND ACETAMINOPHEN 1 TABLET: 7.5; 325 TABLET ORAL at 19:15

## 2018-04-09 RX ADMIN — LISINOPRIL 2.5 MG: 5 TABLET ORAL at 08:58

## 2018-04-09 RX ADMIN — HYDROCODONE BITARTRATE AND ACETAMINOPHEN 1 TABLET: 7.5; 325 TABLET ORAL at 12:52

## 2018-04-09 RX ADMIN — APIXABAN 5 MG: 5 TABLET, FILM COATED ORAL at 08:58

## 2018-04-09 RX ADMIN — CARVEDILOL 3.12 MG: 3.12 TABLET, FILM COATED ORAL at 17:37

## 2018-04-09 RX ADMIN — CARVEDILOL 3.12 MG: 3.12 TABLET, FILM COATED ORAL at 08:59

## 2018-04-09 NOTE — ROUTINE PROCESS
Bedside shift change report given to Emir Juarez (oncoming nurse) by [employer]  (offgoing nurse). Report included the following information SBAR, Procedure Summary, Intake/Output, MAR and Recent Results.

## 2018-04-09 NOTE — PROGRESS NOTES
Problem: Falls - Risk of  Goal: *Absence of Falls  Document Cynthia Fall Risk and appropriate interventions in the flowsheet.    Outcome: Progressing Towards Goal  Fall Risk Interventions:       Mentation Interventions: Bed/chair exit alarm, Door open when patient unattended, Family/sitter at bedside    Medication Interventions: Bed/chair exit alarm, Evaluate medications/consider consulting pharmacy    Elimination Interventions: Call light in reach, Bed/chair exit alarm             Problem: Patient Education: Go to Patient Education Activity  Goal: Patient/Family Education  Outcome: Progressing Towards Goal  Bed alarm-hx of dementia    Problem: LE Bypass: Post-Op Day 1  Goal: Off Pathway (Use only if patient is Off Pathway)  Outcome: Progressing Towards Goal  Up with PT  Goal: Activity/Safety  Outcome: Progressing Towards Goal  Up with one assist/PT  Goal: Consults, if ordered  Outcome: Progressing Towards Goal  PT/OT  Goal: Diagnostic Test/Procedures  ECHO-Mcclure in due to difficult placement-may need urology consult for followup  Goal: Discharge Planning  Outcome: Progressing Towards Goal  To be determined  Goal: Medications  Outcome: Progressing Towards Goal  Norco every 4 hours  Goal: Respiratory  Outcome: Progressing Towards Goal  Room air  Goal: *Lungs clear or at baseline  Outcome: Progressing Towards Goal  Encourage Incentive

## 2018-04-09 NOTE — PROGRESS NOTES
Spiritual Care Partner Volunteer visited patient in Rm 443 on 4/9/2018.   Documented by:  Chaplain Gustafson MDiv, MS, [facility]  [phone])

## 2018-04-09 NOTE — PROGRESS NOTES
10:00 am. .Reason for Admission:   Ischemia right lower leg,  THROMBECTOMY/EMBOLECTOMY  RIGHT LOWER EXTREMITY WITH INTRAOPERATIVE ANGIOGRAM, ILIAC STENT, FASCIOTOMY  On 4/8/18    RRAT Score:     12   Plan for utilizing home health:  Patient will be transitioning to Rehab at a SNF         Likelihood of Readmission:   Low to moderate due to his dementia and current medical condition. CM met with patient, he was asleep. CM spoke with daughter Rosalynd Dance [phone], she said patient lives with her and her family. Patient needs assistance with his ADL's and IADL's due to his dementia. Daughter said patient had been to [address] [place] [address] in the past. Patient's family will provide transportation. CM awaiting PT/OT evaluations to determine disposition. CM will continue to follow for transition of care. Arslan Cartagena MSA, RN,CRM. Care Management Interventions  PCP Verified by CM: Yes  Palliative Care Criteria Met (RRAT>21 & CHF Dx)?: No  Mode of Transport at Discharge: Other (see comment) (Private car)  Transition of Care Consult (CM Consult): SNF, Discharge Planning  MyChart Signup: No  Discharge Durable Medical Equipment: No  Health Maintenance Reviewed: Yes  Physical Therapy Consult: Yes  Occupational Therapy Consult: No  Speech Therapy Consult: No  Current Support Network: Lives with Caregiver  Confirm Follow Up Transport: Family  Plan discussed with Pt/Family/Caregiver:  Yes

## 2018-04-09 NOTE — ROUTINE PROCESS
Bedside and Verbal shift change report given to Mallory Miller (oncoming nurse) by Stacey Byrd (offgoing nurse). Report included the following information SBAR, OR Summary, MAR, Accordion, Recent Results and Cardiac Rhythm SR with BBB PVC and PAC.

## 2018-04-09 NOTE — PROGRESS NOTES
Leg seems to be sensitive. He can't describe his pain well. Visit Vitals    /68 (BP 1 Location: Right arm, BP Patient Position: At rest)    Pulse 80    Temp 98.1 °F (36.7 °C)    Resp 16    Ht 5' 4\" (1.626 m)    Wt 62.5 kg (137 lb 12.8 oz)    SpO2 94%    BMI 23.65 kg/m2     rigth foot hot, good signals  Fasciotomy sites clean and dry  Groin incision clean    59 y/o WM with acute RLE ischemia, s/p thrombectomy and stenting  - Foot hot with good signals. Fasciotomy sites clean. - Unsure about his pain, has dementia and can not verbalize well. Will add neurontin and ask if the hospitalist have anything to add for his dementia and/or further evaluation of his leg pain.   - will switch to eliquis for now, will have to decide if he will tolerate at home  -PT/OT

## 2018-04-09 NOTE — PROGRESS NOTES
Bedside and Verbal shift change report given to Dotty Alva (oncoming nurse) by Deion Garcia (offgoing nurse). Report included the following information SBAR and Kardex. Patient left with 2 assist to stretcher for ECHO. Left hand PIV bleeding at site. Pulled since he had 2 other PIV's.  Mcclure intact

## 2018-04-09 NOTE — PROGRESS NOTES
Problem: Falls - Risk of  Goal: *Absence of Falls  Document Cynthia Fall Risk and appropriate interventions in the flowsheet.    Outcome: Progressing Towards Goal  Fall Risk Interventions:       Mentation Interventions: Bed/chair exit alarm, Family/sitter at bedside    Medication Interventions: Bed/chair exit alarm    Elimination Interventions: Call light in reach

## 2018-04-09 NOTE — PROGRESS NOTES
Problem: Self Care Deficits Care Plan (Adult)  Goal: *Acute Goals and Plan of Care (Insert Text)  Occupational Therapy Goals  Initiated 4/9/2018  1. Patient will perform lower body dressing with supervision within 7 day(s). 2.  Patient will perform standing ADLs 5 mins with supervision/set-up within 7 day(s). 3.  Patient will perform bathing with supervision/set-up within 7 day(s). 4.  Patient will perform toilet transfers with supervision/set-up within 7 day(s). 5.  Patient will perform all aspects of toileting with supervision/set-up within 7 day(s). 6.  Patient will participate in upper extremity therapeutic exercise/activities with light resistance supervision/set-up for 5 minutes within 7 day(s). 7.  Patient will utilize energy conservation techniques during functional activities with verbal cues within 7 day(s). Occupational Therapy EVALUATION  Patient: August West (86 y.o. male)  Date: 4/9/2018  Primary Diagnosis: right femoral clot  Ischemia of right lower extremity  Procedure(s) (LRB):  THROMBECTOMY/EMBOLECTOMY  RIGHT LOWER EXTREMITY WITH INTRAOPERATIVE ANGIOGRAM, ILIAC STENT, FASCIOTOMY (Right) 2 Days Post-Op   Precautions:   Fall, WBAT    ASSESSMENT :  Based on the objective data described below, the patient presents with independent to setup upper body ADLs, moderate to total A lower body ADLs, supine-sit supervision L side as would at home, sit<>stand min A RW and unable to complete functional mobility. ADLs limited by pain management, WB R LE, standing tolerance, standing balance, activity tolerance, R LE ROM, pain management, and unclear what is baseline and what is acute cognitive deficits & startle reflex to the L side. Recommend informing patient of what you are going to do prior to doing it to keep startle reflex down. Currently, patient is not safe to discharge home without 24 hour supervision, BSC, RW and HHOT.      Recommend with nursing patient to complete as able in order to maintain strength, endurance and independence: ADLs with supervision/setup, OOB to chair 3x/day and mobilizing to the [facility] for toileting with 1 assist. Thank you for your assistance. Patient will benefit from skilled intervention to address the above impairments. Patients rehabilitation potential is considered to be Good  Factors which may influence rehabilitation potential include:   [x]             None noted  []             Mental ability/status  []             Medical condition  []             Home/family situation and support systems  []             Safety awareness  []             Pain tolerance/management  []             Other:      PLAN :  Recommendations and Planned Interventions:  [x]               Self Care Training                  [x]        Therapeutic Activities  [x]               Functional Mobility Training    [x]        Cognitive Retraining  [x]               Therapeutic Exercises           [x]        Endurance Activities  [x]               Balance Training                   []        Neuromuscular Re-Education  []               Visual/Perceptual Training     [x]   Home Safety Training  [x]               Patient Education                 [x]        Family Training/Education  []               Other (comment):    Frequency/Duration: Patient will be followed by occupational therapy 3 times a week to address goals. Discharge Recommendations: Rehab  Further Equipment Recommendations for Discharge: TBD     SUBJECTIVE:   Patient stated I don't know.     OBJECTIVE DATA SUMMARY:   HISTORY:   Past Medical History:   Diagnosis Date    CAD (coronary artery disease) 12/10/2010    Chronic airway obstruction, not elsewhere classified 12/10/2010    Chronic systolic heart failure ([facility] [address] 12/10/2010    COPD (chronic obstructive pulmonary disease) (HCC)     GERD (gastroesophageal reflux disease)     ICD (implantable cardioverter-defibrillator) in place     Ischemic cardiomyopathy     PAD (peripheral artery disease) ([facility] [address]     Tobacco use disorder    History reviewed. No pertinent surgical history. Prior Level of Function/Environment/Context: modified independence increased time basic ADLs, standing to shower. Daughter completes all instrumental ADLs, including driving. Occupations in which the patient is/was successful, what are the barriers preventing that success:   Performance Patterns (routines, roles, habits, and rituals): watching tv  Personal Interests and/or values:   Expanded or extensive additional review of patient history:     Home Situation  Home Environment: Private residence  One/Two Story Residence: One story  Living Alone: No  Support Systems: Child(shirin)  Patient Expects to be Discharged to[de-identified] Unknown  Current DME Used/Available at Home: None  Tub or Shower Type: Tub/Shower combination  [x]  Right hand dominant   []  Left hand dominant    EXAMINATION OF PERFORMANCE DEFICITS:  Cognitive/Behavioral Status:  Neurologic State: Alert  Orientation Level: Oriented to person;Oriented to place; Disoriented to situation  Cognition: Follows commands;Memory loss;Poor safety awareness  Perception: Appears intact  Perseveration: No perseveration noted  Safety/Judgement: Awareness of environment; Insight into deficits    Skin: intact R leg bandage    Edema: intact    Hearing: Auditory  Auditory Impairment: None    Vision/Perceptual:                           Acuity: Impaired near vision; Impaired far vision (slightly blurry, states acute)         Range of Motion:    AROM: Generally decreased, functional (Except R ankle DF/PF)                         Strength:    Strength: Generally decreased, functional (In L LE; R ankle DF/PF 2-/5)                Coordination:  Coordination: Within functional limits  Fine Motor Skills-Upper: Left Intact; Right Intact    Gross Motor Skills-Upper: Left Intact; Right Intact    Tone & Sensation:    Tone: Normal  Sensation: Impaired (R LE: light touch absent from distal patella to toes)                      Balance:  Sitting: Intact; With support; Without support  Standing: Impaired; With support  Standing - Static: Fair  Standing - Dynamic : Poor    Functional Mobility and Transfers for ADLs:  Bed Mobility:  Supine to Sit: Stand-by assistance; Additional time  Sit to Supine: Stand-by assistance; Additional time  Scooting: Stand-by assistance; Additional time (Towards HOB, lateral scooting, a/p scooting in sitting)    Transfers:  Sit to Stand: Assist x1;Minimum assistance; Additional time; RW, gait belt  Stand to Sit: Assist x1;Minimum assistance; Additional time  Toilet Transfer : Total assistance (will need BSC)  Tub Transfer: Total assistance (not safe at this time)    ADL Assessment:  Feeding: Independent no A setting up or opening    Oral Facial Hygiene/Grooming: Supervision setup on beside tray, sitting EOB     Bathing: Moderate assistance mock demonstration could complete full front however poor standing balance    Upper Body Dressing: Supervision setup, sitting EOB balance intact    Lower Body Dressing: Moderate assistance  Fatigue doffing and donning socks, poor standing balance for clothing management; RW    Toileting: Moderate assistance able to side sit and mock reach without A, poor standing balance for clothing management; RW                 ADL Intervention and task modifications:                                     Cognitive Retraining  Safety/Judgement: Awareness of environment; Insight into deficits    Therapeutic Exercise:     Functional Measure:  Barthel Index:    Bathin  Bladder: 0  Bowels: 0  Groomin  Dressin  Feeding: 10  Mobility: 0  Stairs: 0  Toilet Use: 5  Transfer (Bed to Chair and Back): 5  Total: 25       Barthel and G-code impairment scale:  Percentage of impairment CH  0% CI  1-19% CJ  20-39% CK  40-59% CL  60-79% CM  80-99% CN  100%   Barthel Score 0-100 100 99-80 79-60 59-40 20-39 1-19   0   Barthel Score 0-20 20 17-19 13-16 9-12 5-8 1-4 0      The Barthel ADL Index: Guidelines  1. The index should be used as a record of what a patient does, not as a record of what a patient could do. 2. The main aim is to establish degree of independence from any help, physical or verbal, however minor and for whatever reason. 3. The need for supervision renders the patient not independent. 4. A patient's performance should be established using the best available evidence. Asking the patient, friends/relatives and nurses are the usual sources, but direct observation and common sense are also important. However direct testing is not needed. 5. Usually the patient's performance over the preceding 24-48 hours is important, but occasionally longer periods will be relevant. 6. Middle categories imply that the patient supplies over 50 per cent of the effort. 7. Use of aids to be independent is allowed. Viv Ferro., Barthel, D.W. (8990). Functional evaluation: the Barthel Index. [address] [facility] (14)2. BROCK Atkins, Sam Carmen., Ej Mcgregor., [place], [address] [place] [address] (1999). Measuring the change indisability after inpatient rehabilitation; comparison of the responsiveness of the Barthel Index and Functional [place] Measure. Journal of Neurology, Neurosurgery, and Psychiatry, 66(4), 949-719. Uriah Rm, N.J.A, KAITLYNN Arreola, & Robert Mao MSusanA. (2004.) Assessment of post-stroke quality of life in cost-effectiveness studies: The usefulness of the Barthel Index and the EuroQoL-5D. Quality of Life Research, 13, 984-43         G codes: In compliance with CMSs Claims Based Outcome Reporting, the following G-code set was chosen for this patient based on their primary functional limitation being treated: The outcome measure chosen to determine the severity of the functional limitation was the Barthel index with a score of 25/100 which was correlated with the impairment scale. ?  Self Care:     - CURRENT STATUS: CL - 60%-79% impaired, limited or restricted    - GOAL STATUS: CJ - 20%-39% impaired, limited or restricted    - D/C STATUS:  ---------------To be determined---------------     Pain:  Pain Scale 1: Numeric (0 - 10)  Pain Intensity 1: 7  Pain Location 1: Leg  Pain Orientation 1: Right  Pain Description 1: Aching  Pain Intervention(s) 1: Cold pack  Activity Tolerance:   VSS  Please refer to the flowsheet for vital signs taken during this treatment. After treatment:   [] Patient left in no apparent distress sitting up in chair  [x] Patient left in no apparent distress sitting edge of bed  [x] Call bell left within reach  [x] Nursing notified  [] Caregiver present  [x] Bed alarm activated    COMMUNICATION/EDUCATION:   The patients plan of care was discussed with: Registered Nurse. [x] Home safety education was provided and the patient/caregiver indicated understanding. [x] Patient/family have participated as able in goal setting and plan of care. [x] Patient/family agree to work toward stated goals and plan of care. [] Patient understands intent and goals of therapy, but is neutral about his/her participation. [] Patient is unable to participate in goal setting and plan of care. This patients plan of care is appropriate for delegation to [facility].     Thank you for this referral.  Skrubyt Langley  Time Calculation: 40 mins

## 2018-04-10 LAB
ERYTHROCYTE [DISTWIDTH] IN BLOOD BY AUTOMATED COUNT: 13.4 % (ref 11.5–14.5)
HCT VFR BLD AUTO: 33.6 % (ref 36.6–50.3)
HGB BLD-MCNC: 10.9 G/DL (ref 12.1–17)
MCH RBC QN AUTO: 32.8 PG (ref 26–34)
MCHC RBC AUTO-ENTMCNC: 32.4 G/DL (ref 30–36.5)
MCV RBC AUTO: 101.2 FL (ref 80–99)
NRBC # BLD: 0 K/UL (ref 0–0.01)
NRBC BLD-RTO: 0 PER 100 WBC
PLATELET # BLD AUTO: 129 K/UL (ref 150–400)
PMV BLD AUTO: 11.8 FL (ref 8.9–12.9)
RBC # BLD AUTO: 3.32 M/UL (ref 4.1–5.7)
WBC # BLD AUTO: 12.6 K/UL (ref 4.1–11.1)

## 2018-04-10 PROCEDURE — 65660000000 HC RM CCU STEPDOWN

## 2018-04-10 PROCEDURE — 36415 COLL VENOUS BLD VENIPUNCTURE: CPT | Performed by: SURGERY

## 2018-04-10 PROCEDURE — 85027 COMPLETE CBC AUTOMATED: CPT | Performed by: SURGERY

## 2018-04-10 PROCEDURE — 74011250636 HC RX REV CODE- 250/636: Performed by: SURGERY

## 2018-04-10 PROCEDURE — 74011250637 HC RX REV CODE- 250/637: Performed by: SURGERY

## 2018-04-10 PROCEDURE — 97110 THERAPEUTIC EXERCISES: CPT

## 2018-04-10 PROCEDURE — 74011250637 HC RX REV CODE- 250/637: Performed by: SPECIALIST

## 2018-04-10 PROCEDURE — 97530 THERAPEUTIC ACTIVITIES: CPT

## 2018-04-10 RX ORDER — GABAPENTIN 100 MG/1
100 CAPSULE ORAL 3 TIMES DAILY
Status: DISCONTINUED | OUTPATIENT
Start: 2018-04-10 | End: 2018-04-12 | Stop reason: HOSPADM

## 2018-04-10 RX ORDER — DEXTROSE, SODIUM CHLORIDE, AND POTASSIUM CHLORIDE 5; .45; .075 G/100ML; G/100ML; G/100ML
50 INJECTION INTRAVENOUS CONTINUOUS
Status: DISCONTINUED | OUTPATIENT
Start: 2018-04-11 | End: 2018-04-12

## 2018-04-10 RX ADMIN — HYDROCODONE BITARTRATE AND ACETAMINOPHEN 1 TABLET: 7.5; 325 TABLET ORAL at 23:33

## 2018-04-10 RX ADMIN — HYDROCODONE BITARTRATE AND ACETAMINOPHEN 1 TABLET: 7.5; 325 TABLET ORAL at 00:04

## 2018-04-10 RX ADMIN — ASPIRIN 81 MG 81 MG: 81 TABLET ORAL at 09:02

## 2018-04-10 RX ADMIN — GABAPENTIN 100 MG: 100 CAPSULE ORAL at 23:33

## 2018-04-10 RX ADMIN — HYDROCODONE BITARTRATE AND ACETAMINOPHEN 1 TABLET: 7.5; 325 TABLET ORAL at 09:02

## 2018-04-10 RX ADMIN — POTASSIUM CHLORIDE, DEXTROSE MONOHYDRATE AND SODIUM CHLORIDE 50 ML/HR: 75; 5; 450 INJECTION, SOLUTION INTRAVENOUS at 23:33

## 2018-04-10 RX ADMIN — CARVEDILOL 3.12 MG: 3.12 TABLET, FILM COATED ORAL at 18:16

## 2018-04-10 RX ADMIN — Medication 10 ML: at 13:26

## 2018-04-10 RX ADMIN — GABAPENTIN 100 MG: 100 CAPSULE ORAL at 09:02

## 2018-04-10 RX ADMIN — APIXABAN 5 MG: 5 TABLET, FILM COATED ORAL at 09:02

## 2018-04-10 RX ADMIN — HYDROCODONE BITARTRATE AND ACETAMINOPHEN 1 TABLET: 7.5; 325 TABLET ORAL at 04:21

## 2018-04-10 RX ADMIN — LISINOPRIL 2.5 MG: 5 TABLET ORAL at 09:02

## 2018-04-10 RX ADMIN — HYDROCODONE BITARTRATE AND ACETAMINOPHEN 1 TABLET: 7.5; 325 TABLET ORAL at 19:14

## 2018-04-10 RX ADMIN — APIXABAN 5 MG: 5 TABLET, FILM COATED ORAL at 18:14

## 2018-04-10 RX ADMIN — GABAPENTIN 100 MG: 100 CAPSULE ORAL at 18:14

## 2018-04-10 RX ADMIN — CARVEDILOL 3.12 MG: 3.12 TABLET, FILM COATED ORAL at 09:02

## 2018-04-10 RX ADMIN — HYDROCODONE BITARTRATE AND ACETAMINOPHEN 1 TABLET: 7.5; 325 TABLET ORAL at 13:26

## 2018-04-10 NOTE — PROGRESS NOTES
Bedside and Verbal shift change report given to melvi (oncoming nurse) by jaki (offgoing nurse). Report included the following information SBAR, Kardex, Intake/Output, Recent Results and Cardiac Rhythm nsr.

## 2018-04-10 NOTE — PROGRESS NOTES
Discussed patients course so far with his daughter Mo Paredes. I have asked my partner to perform closure of his fasciotomy wounds in the OR under sedation tomorrow morning. She understands and will come by later today to sign the consent. Hopefully we can find him a bed at a rehab facility for a Thursday discharge.

## 2018-04-10 NOTE — PROGRESS NOTES
Doing well, currently says he has no pain sitting up in bed  Visit Vitals    /61 (BP 1 Location: Left arm, BP Patient Position: At rest)    Pulse 88    Temp 98.2 °F (36.8 °C)    Resp 18    Ht 5' 4\" (1.626 m)    Wt 60 kg (132 lb 4.4 oz)    SpO2 96%    BMI 22.71 kg/m2     Right foot hot and dry  Groin incision clean  Fasciotomy sites dressed       59 y/o WM with acute RLE ischemia, s/p thrombectomy and stenting  - Foot hot with good signals. Fasciotomy sites clean. May try to close fasciotomy site in the OR so the patient doesn't have wounds. - Unsure about his pain, has dementia and can not verbalize well.  No pain this am?  - [facility]clure out  - will switch to eliquis for now, will have to decide if he will tolerate at home  -PT/OT

## 2018-04-10 NOTE — PROGRESS NOTES
Problem: Mobility Impaired (Adult and Pediatric)  Goal: *Acute Goals and Plan of Care (Insert Text)  Physical Therapy Goals  Initiated 4/9/2018  1. Patient will move from supine to sit and sit to supine, scoot up and down and roll side to side in bed with independence within 7 day(s). 2.  Patient will transfer from bed to chair and chair to bed with supervision/set-up using the least restrictive device within 7 day(s). 3.  Patient will perform sit to stand with contact guard assist within 7 day(s). 4.  Patient will ambulate with moderate assistance for 25 feet with the least restrictive device within 7 day(s). 5.  Stair goal to be addressed when appropriate. 6.  Patient will attempt a 6 MWT prior to discharge for diagnosis of HF.      physical Therapy TREATMENT  Patient: Janell Buchanan (51 y.o. male)  Date: 4/10/2018  Diagnosis: right femoral clot  Ischemia of right lower extremity  RIGHT LEG WOUND  <principal problem not specified>  Procedure(s) (LRB):  THROMBECTOMY/EMBOLECTOMY  RIGHT LOWER EXTREMITY WITH INTRAOPERATIVE ANGIOGRAM, ILIAC STENT, FASCIOTOMY (Right) 3 Days Post-Op  Precautions: Fall, WBAT, Bed/Chair Alarm, Baseline Dementia. Chart, physical therapy assessment, plan of care and goals were reviewed. ASSESSMENT:  Patient cleared for progressive mobility training per RN. Upon entrance into patient's room, patient cradling his R leg reporting pain \"all over. \" Noted increased erythema around ankle - RN made aware. Despite pain, patient agreed to participate in session. Warm-up consisted of LE ankle pumps (minimal DF/PF observed in R LE), quad sets, and SLRs. Patient remains disoriented to situation and time, inquiring \"what happened to my leg? \" Patient required minimal assist this date to achieve sitting at EOB from supine. Patient required maximal cuing and moderate assistance to approximate R heel to floor (encouraged anterior lean of trunk over thigh to maximize R foot WBing x 5).  Patient required minimal assist to stand to rolling walker (again, patient not placing his R foot on the floor). Patient able to stand-pivot on his L LE and heavily support his weight via rolling walker in order to safely sit in bedside recliner (moderate assist - steadying, pivot motion, walker management). Chair alarm activated at conclusion of session. Continue to recommend discharge to SNF once medically stable for discharge. Recommend with Nursing: Patient to complete as able in order to maintain strength, endurance, and independence: OOB to chair 3x/day and mobilizing to the [facility] for toileting needs with A x 2 (2nd person for safety), gait belt, and activated bed/chair alarm. Thank you for your assistance.      Progression toward goals:  []    Improving appropriately and progressing toward goals  [x]    Improving slowly and progressing toward goals  []    Not making progress toward goals and plan of care will be adjusted     PLAN:  Patient continues to benefit from skilled intervention to address the above impairments. Continue treatment per established plan of care. Discharge Recommendations:  Shimon Paulino  Further Equipment Recommendations for Discharge:  TBD a SNF     SUBJECTIVE:   Patient stated What happened to my leg?     OBJECTIVE DATA SUMMARY:   Critical Behavior:  Neurologic State: Alert  Orientation Level: Disoriented to situation, Disoriented to time, Oriented to person, Oriented to place  Cognition: Decreased attention/concentration, Follows commands, Impulsive  Safety/Judgement: Awareness of environment, Insight into deficits  Functional Mobility Training:  Bed Mobility:  Supine to Sit: Assist x1;Minimum assistance  Scooting: Additional time;Stand-by assistance  Transfers:  Sit to Stand: Assist x1;Minimum assistance; Additional time  Stand to Sit: Assist x1;Minimum assistance; Additional time     Stand Pivot Transfers: Assist x1 (Moderate assistance + RW)  Bed to Chair: Assist x1; Moderate assistance; Adaptive equipment (w/ RW)  Balance:  Sitting: Intact; With support; Without support  Standing: Impaired; With support  Standing - Static: Fair  Standing - Dynamic : Fair    Pain:  Pain Scale 1: Numeric (0 - 10)  Pain Intensity 1: 3  Pain Location 1: Leg  Pain Orientation 1: Right  Pain Description 1: Sore  Pain Intervention(s) 1: Medication (see MAR)     Activity Tolerance:  Please refer to the flowsheet for vital signs taken during this treatment.   After treatment:   [x]    Patient left in no apparent distress sitting up in chair  []    Patient left in no apparent distress in bed  [x]    Call bell left within reach  [x]    Nursing notified  []    Caregiver present  [x]    Chair alarm activated    COMMUNICATION/COLLABORATION:   The patients plan of care was discussed with: Registered Nurse    Jose Manuel Carrillo PT, DPT   Time Calculation: 25 mins

## 2018-04-10 NOTE — PROGRESS NOTES
Bedside shift change report given to [address] [place] [address],[address] (oncoming nurse) by Danis Ladd RN (offgoing nurse). Report included the following information SBAR, Kardex, ED Summary, Procedure Summary, Intake/Output, MAR and Recent Results.

## 2018-04-10 NOTE — PROGRESS NOTES
Problem: Falls - Risk of  Goal: *Absence of Falls  Document Cynthia Fall Risk and appropriate interventions in the flowsheet.    Outcome: Progressing Towards Goal  Fall Risk Interventions:  Mobility Interventions: Assess mobility with egress test, Communicate number of staff needed for ambulation/transfer, Patient to call before getting OOB, PT Consult for mobility concerns, PT Consult for assist device competence, Strengthening exercises (ROM-active/passive)    Mentation Interventions: Adequate sleep, hydration, pain control, Bed/chair exit alarm, Door open when patient unattended, More frequent rounding, Toileting rounds, Update white board    Medication Interventions: Patient to call before getting OOB, Teach patient to arise slowly    Elimination Interventions: Call light in reach, Patient to call for help with toileting needs, Toileting schedule/hourly rounds

## 2018-04-10 NOTE — PROGRESS NOTES
Bedside verbal shift change report given to oncoming nurse KEENAN HAMILTON. Opportunity for questions and clarifications provided.

## 2018-04-11 ENCOUNTER — ANESTHESIA EVENT (OUTPATIENT)
Dept: SURGERY | Age: 63
DRG: 253 | End: 2018-04-11
Payer: MEDICARE

## 2018-04-11 ENCOUNTER — ANESTHESIA (OUTPATIENT)
Dept: SURGERY | Age: 63
DRG: 253 | End: 2018-04-11
Payer: MEDICARE

## 2018-04-11 PROCEDURE — 74011000250 HC RX REV CODE- 250: Performed by: SURGERY

## 2018-04-11 PROCEDURE — 77030011256 HC DRSG MEPILEX <16IN NO BORD MOLN -A

## 2018-04-11 PROCEDURE — 74011250636 HC RX REV CODE- 250/636: Performed by: ANESTHESIOLOGY

## 2018-04-11 PROCEDURE — 74011250637 HC RX REV CODE- 250/637: Performed by: SURGERY

## 2018-04-11 PROCEDURE — 65660000000 HC RM CCU STEPDOWN

## 2018-04-11 PROCEDURE — 74011250636 HC RX REV CODE- 250/636

## 2018-04-11 PROCEDURE — 77030018842 HC SOL IRR SOD CL 9% BAXT -A: Performed by: SURGERY

## 2018-04-11 PROCEDURE — 76060000032 HC ANESTHESIA 0.5 TO 1 HR: Performed by: SURGERY

## 2018-04-11 PROCEDURE — 77030012940 HC DRSG HYDRCOIL BMS -B

## 2018-04-11 PROCEDURE — 77030031139 HC SUT VCRL2 J&J -A: Performed by: SURGERY

## 2018-04-11 PROCEDURE — 77030011640 HC PAD GRND REM COVD -A: Performed by: SURGERY

## 2018-04-11 PROCEDURE — 77030008467 HC STPLR SKN COVD -B: Performed by: SURGERY

## 2018-04-11 PROCEDURE — 77030018836 HC SOL IRR NACL ICUM -A: Performed by: SURGERY

## 2018-04-11 PROCEDURE — 74011250636 HC RX REV CODE- 250/636: Performed by: SURGERY

## 2018-04-11 PROCEDURE — 74011250637 HC RX REV CODE- 250/637: Performed by: SPECIALIST

## 2018-04-11 PROCEDURE — 76010000138 HC OR TIME 0.5 TO 1 HR: Performed by: SURGERY

## 2018-04-11 PROCEDURE — 76210000017 HC OR PH I REC 1.5 TO 2 HR: Performed by: SURGERY

## 2018-04-11 PROCEDURE — 77030002916 HC SUT ETHLN J&J -A: Performed by: SURGERY

## 2018-04-11 PROCEDURE — 0JQN0ZZ REPAIR RIGHT LOWER LEG SUBCUTANEOUS TISSUE AND FASCIA, OPEN APPROACH: ICD-10-PCS | Performed by: SURGERY

## 2018-04-11 RX ORDER — ONDANSETRON 2 MG/ML
4 INJECTION INTRAMUSCULAR; INTRAVENOUS AS NEEDED
Status: DISCONTINUED | OUTPATIENT
Start: 2018-04-11 | End: 2018-04-11 | Stop reason: HOSPADM

## 2018-04-11 RX ORDER — FENTANYL CITRATE 50 UG/ML
50 INJECTION, SOLUTION INTRAMUSCULAR; INTRAVENOUS AS NEEDED
Status: DISCONTINUED | OUTPATIENT
Start: 2018-04-11 | End: 2018-04-11 | Stop reason: ALTCHOICE

## 2018-04-11 RX ORDER — DIPHENHYDRAMINE HYDROCHLORIDE 50 MG/ML
12.5 INJECTION, SOLUTION INTRAMUSCULAR; INTRAVENOUS AS NEEDED
Status: DISCONTINUED | OUTPATIENT
Start: 2018-04-11 | End: 2018-04-11 | Stop reason: HOSPADM

## 2018-04-11 RX ORDER — SODIUM CHLORIDE, SODIUM LACTATE, POTASSIUM CHLORIDE, CALCIUM CHLORIDE 600; 310; 30; 20 MG/100ML; MG/100ML; MG/100ML; MG/100ML
125 INJECTION, SOLUTION INTRAVENOUS CONTINUOUS
Status: DISCONTINUED | OUTPATIENT
Start: 2018-04-11 | End: 2018-04-12

## 2018-04-11 RX ORDER — SODIUM CHLORIDE 0.9 % (FLUSH) 0.9 %
5-10 SYRINGE (ML) INJECTION AS NEEDED
Status: DISCONTINUED | OUTPATIENT
Start: 2018-04-11 | End: 2018-04-11 | Stop reason: ALTCHOICE

## 2018-04-11 RX ORDER — MIDAZOLAM HYDROCHLORIDE 1 MG/ML
0.5 INJECTION, SOLUTION INTRAMUSCULAR; INTRAVENOUS
Status: DISCONTINUED | OUTPATIENT
Start: 2018-04-11 | End: 2018-04-11 | Stop reason: SDUPTHER

## 2018-04-11 RX ORDER — SODIUM CHLORIDE 0.9 % (FLUSH) 0.9 %
5-10 SYRINGE (ML) INJECTION AS NEEDED
Status: DISCONTINUED | OUTPATIENT
Start: 2018-04-11 | End: 2018-04-12 | Stop reason: HOSPADM

## 2018-04-11 RX ORDER — CEFAZOLIN SODIUM/WATER 2 G/20 ML
2 SYRINGE (ML) INTRAVENOUS
Status: COMPLETED | OUTPATIENT
Start: 2018-04-11 | End: 2018-04-11

## 2018-04-11 RX ORDER — SODIUM CHLORIDE 0.9 % (FLUSH) 0.9 %
5-10 SYRINGE (ML) INJECTION AS NEEDED
Status: DISCONTINUED | OUTPATIENT
Start: 2018-04-11 | End: 2018-04-11 | Stop reason: SDUPTHER

## 2018-04-11 RX ORDER — PROPOFOL 10 MG/ML
INJECTION, EMULSION INTRAVENOUS
Status: DISCONTINUED | OUTPATIENT
Start: 2018-04-11 | End: 2018-04-11 | Stop reason: HOSPADM

## 2018-04-11 RX ORDER — LIDOCAINE HYDROCHLORIDE 10 MG/ML
0.1 INJECTION, SOLUTION EPIDURAL; INFILTRATION; INTRACAUDAL; PERINEURAL AS NEEDED
Status: DISCONTINUED | OUTPATIENT
Start: 2018-04-11 | End: 2018-04-11 | Stop reason: ALTCHOICE

## 2018-04-11 RX ORDER — MIDAZOLAM HYDROCHLORIDE 1 MG/ML
INJECTION, SOLUTION INTRAMUSCULAR; INTRAVENOUS AS NEEDED
Status: DISCONTINUED | OUTPATIENT
Start: 2018-04-11 | End: 2018-04-11 | Stop reason: HOSPADM

## 2018-04-11 RX ORDER — SODIUM CHLORIDE 9 MG/ML
25 INJECTION, SOLUTION INTRAVENOUS CONTINUOUS
Status: DISCONTINUED | OUTPATIENT
Start: 2018-04-11 | End: 2018-04-11 | Stop reason: SDUPTHER

## 2018-04-11 RX ORDER — SODIUM CHLORIDE 9 MG/ML
25 INJECTION, SOLUTION INTRAVENOUS CONTINUOUS
Status: DISCONTINUED | OUTPATIENT
Start: 2018-04-11 | End: 2018-04-11 | Stop reason: HOSPADM

## 2018-04-11 RX ORDER — MIDAZOLAM HYDROCHLORIDE 1 MG/ML
1 INJECTION, SOLUTION INTRAMUSCULAR; INTRAVENOUS AS NEEDED
Status: DISCONTINUED | OUTPATIENT
Start: 2018-04-11 | End: 2018-04-11 | Stop reason: ALTCHOICE

## 2018-04-11 RX ORDER — SODIUM CHLORIDE 0.9 % (FLUSH) 0.9 %
5-10 SYRINGE (ML) INJECTION AS NEEDED
Status: DISCONTINUED | OUTPATIENT
Start: 2018-04-11 | End: 2018-04-11 | Stop reason: HOSPADM

## 2018-04-11 RX ORDER — SODIUM CHLORIDE, SODIUM LACTATE, POTASSIUM CHLORIDE, CALCIUM CHLORIDE 600; 310; 30; 20 MG/100ML; MG/100ML; MG/100ML; MG/100ML
75 INJECTION, SOLUTION INTRAVENOUS CONTINUOUS
Status: DISCONTINUED | OUTPATIENT
Start: 2018-04-11 | End: 2018-04-11 | Stop reason: SDUPTHER

## 2018-04-11 RX ORDER — ROPIVACAINE HYDROCHLORIDE 5 MG/ML
30 INJECTION, SOLUTION EPIDURAL; INFILTRATION; PERINEURAL ONCE
Status: DISCONTINUED | OUTPATIENT
Start: 2018-04-11 | End: 2018-04-11 | Stop reason: ALTCHOICE

## 2018-04-11 RX ORDER — FENTANYL CITRATE 50 UG/ML
25 INJECTION, SOLUTION INTRAMUSCULAR; INTRAVENOUS
Status: DISCONTINUED | OUTPATIENT
Start: 2018-04-11 | End: 2018-04-11 | Stop reason: HOSPADM

## 2018-04-11 RX ORDER — MORPHINE SULFATE 10 MG/ML
2 INJECTION, SOLUTION INTRAMUSCULAR; INTRAVENOUS
Status: DISCONTINUED | OUTPATIENT
Start: 2018-04-11 | End: 2018-04-11 | Stop reason: HOSPADM

## 2018-04-11 RX ORDER — SODIUM CHLORIDE 0.9 % (FLUSH) 0.9 %
5-10 SYRINGE (ML) INJECTION EVERY 8 HOURS
Status: DISCONTINUED | OUTPATIENT
Start: 2018-04-11 | End: 2018-04-12 | Stop reason: HOSPADM

## 2018-04-11 RX ORDER — MIDAZOLAM HYDROCHLORIDE 1 MG/ML
1 INJECTION, SOLUTION INTRAMUSCULAR; INTRAVENOUS AS NEEDED
Status: DISCONTINUED | OUTPATIENT
Start: 2018-04-11 | End: 2018-04-11 | Stop reason: SDUPTHER

## 2018-04-11 RX ORDER — MIDAZOLAM HYDROCHLORIDE 1 MG/ML
0.5 INJECTION, SOLUTION INTRAMUSCULAR; INTRAVENOUS
Status: DISCONTINUED | OUTPATIENT
Start: 2018-04-11 | End: 2018-04-11 | Stop reason: HOSPADM

## 2018-04-11 RX ORDER — LIDOCAINE HYDROCHLORIDE 10 MG/ML
INJECTION INFILTRATION; PERINEURAL AS NEEDED
Status: DISCONTINUED | OUTPATIENT
Start: 2018-04-11 | End: 2018-04-11 | Stop reason: HOSPADM

## 2018-04-11 RX ORDER — SODIUM CHLORIDE 9 MG/ML
25 INJECTION, SOLUTION INTRAVENOUS CONTINUOUS
Status: DISCONTINUED | OUTPATIENT
Start: 2018-04-11 | End: 2018-04-12

## 2018-04-11 RX ORDER — FENTANYL CITRATE 50 UG/ML
INJECTION, SOLUTION INTRAMUSCULAR; INTRAVENOUS AS NEEDED
Status: DISCONTINUED | OUTPATIENT
Start: 2018-04-11 | End: 2018-04-11 | Stop reason: HOSPADM

## 2018-04-11 RX ORDER — FENTANYL CITRATE 50 UG/ML
25 INJECTION, SOLUTION INTRAMUSCULAR; INTRAVENOUS
Status: DISCONTINUED | OUTPATIENT
Start: 2018-04-11 | End: 2018-04-11 | Stop reason: SDUPTHER

## 2018-04-11 RX ORDER — DIPHENHYDRAMINE HYDROCHLORIDE 50 MG/ML
12.5 INJECTION, SOLUTION INTRAMUSCULAR; INTRAVENOUS AS NEEDED
Status: DISCONTINUED | OUTPATIENT
Start: 2018-04-11 | End: 2018-04-11 | Stop reason: SDUPTHER

## 2018-04-11 RX ORDER — ACETAMINOPHEN 10 MG/ML
INJECTION, SOLUTION INTRAVENOUS AS NEEDED
Status: DISCONTINUED | OUTPATIENT
Start: 2018-04-11 | End: 2018-04-11 | Stop reason: HOSPADM

## 2018-04-11 RX ORDER — SODIUM CHLORIDE 9 MG/ML
25 INJECTION, SOLUTION INTRAVENOUS CONTINUOUS
Status: DISCONTINUED | OUTPATIENT
Start: 2018-04-11 | End: 2018-04-11 | Stop reason: ALTCHOICE

## 2018-04-11 RX ORDER — SODIUM CHLORIDE, SODIUM LACTATE, POTASSIUM CHLORIDE, CALCIUM CHLORIDE 600; 310; 30; 20 MG/100ML; MG/100ML; MG/100ML; MG/100ML
75 INJECTION, SOLUTION INTRAVENOUS CONTINUOUS
Status: DISCONTINUED | OUTPATIENT
Start: 2018-04-11 | End: 2018-04-11 | Stop reason: HOSPADM

## 2018-04-11 RX ORDER — SODIUM CHLORIDE 0.9 % (FLUSH) 0.9 %
5-10 SYRINGE (ML) INJECTION EVERY 8 HOURS
Status: DISCONTINUED | OUTPATIENT
Start: 2018-04-11 | End: 2018-04-11 | Stop reason: ALTCHOICE

## 2018-04-11 RX ADMIN — CARVEDILOL 3.12 MG: 3.12 TABLET, FILM COATED ORAL at 15:59

## 2018-04-11 RX ADMIN — SODIUM CHLORIDE, SODIUM LACTATE, POTASSIUM CHLORIDE, AND CALCIUM CHLORIDE 125 ML/HR: 600; 310; 30; 20 INJECTION, SOLUTION INTRAVENOUS at 08:38

## 2018-04-11 RX ADMIN — GABAPENTIN 100 MG: 100 CAPSULE ORAL at 16:00

## 2018-04-11 RX ADMIN — HYDROCODONE BITARTRATE AND ACETAMINOPHEN 1 TABLET: 7.5; 325 TABLET ORAL at 21:20

## 2018-04-11 RX ADMIN — APIXABAN 5 MG: 5 TABLET, FILM COATED ORAL at 17:23

## 2018-04-11 RX ADMIN — PROPOFOL 50 MCG/KG/MIN: 10 INJECTION, EMULSION INTRAVENOUS at 10:12

## 2018-04-11 RX ADMIN — FENTANYL CITRATE 25 MCG: 50 INJECTION, SOLUTION INTRAMUSCULAR; INTRAVENOUS at 10:15

## 2018-04-11 RX ADMIN — FENTANYL CITRATE 25 MCG: 50 INJECTION, SOLUTION INTRAMUSCULAR; INTRAVENOUS at 10:50

## 2018-04-11 RX ADMIN — CARVEDILOL 3.12 MG: 3.12 TABLET, FILM COATED ORAL at 08:17

## 2018-04-11 RX ADMIN — Medication 10 ML: at 22:00

## 2018-04-11 RX ADMIN — FENTANYL CITRATE 25 MCG: 50 INJECTION, SOLUTION INTRAMUSCULAR; INTRAVENOUS at 10:25

## 2018-04-11 RX ADMIN — SODIUM CHLORIDE, SODIUM LACTATE, POTASSIUM CHLORIDE, AND CALCIUM CHLORIDE 125 ML/HR: 600; 310; 30; 20 INJECTION, SOLUTION INTRAVENOUS at 13:32

## 2018-04-11 RX ADMIN — FENTANYL CITRATE 50 MCG: 50 INJECTION, SOLUTION INTRAMUSCULAR; INTRAVENOUS at 08:46

## 2018-04-11 RX ADMIN — ACETAMINOPHEN 1000 MG: 10 INJECTION, SOLUTION INTRAVENOUS at 10:24

## 2018-04-11 RX ADMIN — Medication 10 ML: at 14:00

## 2018-04-11 RX ADMIN — MIDAZOLAM HYDROCHLORIDE 2 MG: 1 INJECTION, SOLUTION INTRAMUSCULAR; INTRAVENOUS at 10:04

## 2018-04-11 RX ADMIN — HYDROCODONE BITARTRATE AND ACETAMINOPHEN 1 TABLET: 7.5; 325 TABLET ORAL at 17:23

## 2018-04-11 RX ADMIN — HYDROCODONE BITARTRATE AND ACETAMINOPHEN 1 TABLET: 7.5; 325 TABLET ORAL at 13:27

## 2018-04-11 RX ADMIN — FENTANYL CITRATE 25 MCG: 50 INJECTION, SOLUTION INTRAMUSCULAR; INTRAVENOUS at 10:20

## 2018-04-11 RX ADMIN — Medication 2 G: at 10:15

## 2018-04-11 RX ADMIN — GABAPENTIN 100 MG: 100 CAPSULE ORAL at 21:20

## 2018-04-11 RX ADMIN — HYDROCODONE BITARTRATE AND ACETAMINOPHEN 1 TABLET: 7.5; 325 TABLET ORAL at 05:44

## 2018-04-11 RX ADMIN — Medication 10 ML: at 13:27

## 2018-04-11 NOTE — PROGRESS NOTES
Patients daughter called and spoke with nurse and stated she is concerned about her fathers pain control and wants stronger medication given and also wants to be called after his surgery this morning to be updated by the MD.

## 2018-04-11 NOTE — PROGRESS NOTES
Problem: Falls - Risk of  Goal: *Absence of Falls  Document Cynthia Fall Risk and appropriate interventions in the flowsheet. Outcome: Progressing Towards Goal  Fall Risk Interventions:  Mobility Interventions: Bed/chair exit alarm, Communicate number of staff needed for ambulation/transfer, OT consult for ADLs, Patient to call before getting OOB, PT Consult for mobility concerns, PT Consult for assist device competence, Strengthening exercises (ROM-active/passive), Utilize walker, cane, or other assitive device    Mentation Interventions: Adequate sleep, hydration, pain control, Bed/chair exit alarm, Evaluate medications/consider consulting pharmacy, Door open when patient unattended, Eyeglasses and hearing aids, Familiar objects from home, Gait belt with transfers/ambulation, Increase mobility, More frequent rounding, Reorient patient, Room close to nurse's station, Self-releasing belt, Toileting rounds, Update white board    Medication Interventions: Bed/chair exit alarm, Assess postural VS orthostatic hypotension, Evaluate medications/consider consulting pharmacy, Patient to call before getting OOB, Teach patient to arise slowly, Utilize gait belt for transfers/ambulation    Elimination Interventions: Bed/chair exit alarm, Call light in reach, Patient to call for help with toileting needs, Toilet paper/wipes in reach, Toileting schedule/hourly rounds, Urinal in reach    History of Falls Interventions: Consult care management for discharge planning, Door open when patient unattended, Evaluate medications/consider consulting pharmacy, Investigate reason for fall, Room close to nurse's station, Utilize gait belt for transfer/ambulation, Bed/chair exit alarm        Problem: Pressure Injury - Risk of  Goal: *Prevention of pressure ulcer  Outcome: Progressing Towards Goal  Friction left buttock    Bedside and Verbal shift change report given to Dmaion Hogan (oncoming nurse) by Adelia Carranza (offgoing nurse).  Report included the following information SBAR, Kardex and OR Summary.

## 2018-04-11 NOTE — BRIEF OP NOTE
BRIEF OPERATIVE NOTE    Date of Procedure: 4/11/2018   Preoperative Diagnosis: RIGHT LEG WOUND   Postoperative Diagnosis: RIGHT LEG WOUND     Procedure(s):  RIGHT LEG WOUND CLOSURE (LOCAL WITH MAC)  Surgeon(s) and Role:     * Tom Rangel MD - Primary         Surgical Assistant: Fernando Zheng    Surgical Staff:  Circ-1: Adrien Colunga RN-1: Maribel Brice RN  Surg Asst-1: Fernando Zheng  Event Time In   Incision Start 1031   Incision Close      Anesthesia: MAC   Estimated Blood Loss: minimal  Specimens: * No specimens in log *   Findings: well perfused tissue  Complications: none  Implants: * No implants in log *

## 2018-04-11 NOTE — PROGRESS NOTES
1100-  Patient off unit to procedure. Bedside shift change report given to Gloria tipton (oncoming nurse) by Alexis Kearney (offgoing nurse). Report included the following information SBAR, Kardex and Procedure Summary.  NSR BBB

## 2018-04-11 NOTE — PROGRESS NOTES
1700: Bedside shift change report given to Jeanie Hobbs RN (oncoming nurse) by Robert Ling RN (offgoing nurse). Report included the following information SBAR, Kardex, Procedure Summary, Intake/Output, MAR, Accordion and Cardiac Rhythm paced. 1930: Bedside shift change report given to Boogie Dunn RN (oncoming nurse) by Jeanie Hobbs RN (offgoing nurse). Report included the following information SBAR, Kardex, Procedure Summary, Intake/Output, Accordion and Cardiac Rhythm.

## 2018-04-11 NOTE — OP NOTES
[facility] [place]  OPERATIVE REPORT    Wili Rogers  MR#: [id number]  : 1955  ACCOUNT #: [de-identified]   DATE OF SERVICE: 2018    PREOPERATIVE DIAGNOSIS:  Right leg fasciotomy wounds. POSTOPERATIVE DIAGNOSIS:  Right leg fasciotomy wounds. PROCEDURE:  Washout and closure of right leg fasciotomy wounds. SURGEON:  Tabatha Lam MD    ANESTHESIA:  Local with sedation. SPECIMENS REMOVED:  None. ESTIMATED BLOOD LOSS:  None. COMPLICATIONS:  None. IMPLANTS:  None. ASSISTANT:  Adele Manriquez.    INDICATIONS FOR PROCEDURE:  The patient is a 68-year-old male who has undergone revascularization and prophylactic fasciotomy earlier. He presents for closure of the fasciotomy wounds. PROCEDURE:  The patient was placed supine on the operating table and sedation was established. Right leg was prepped and draped in standard surgical fashion. Both open wounds were infiltrated with 1% lidocaine. Using a pressure lavage , the wounds were washed out with antibiotic solution and they were closed with a subcutaneous deep layer of 3-0 Vicryl followed by staple closure of the skin. Dry sterile dressings were applied. The counts were correct at the end of the case x2. The patient was then awoken and transported to the recovery room in stable condition.       Michelle Bolden MD       AM / JOELLE  D: 2018 10:55     T: 2018 13:52  JOB #: [id number]

## 2018-04-11 NOTE — PROGRESS NOTES
Patient is s/p RIGHT LEG WOUND CLOSURE (LOCAL WITH MAC today. CM received a call from patient's daughter, she said she was told that her father would be ready for discharge tomorrow. She asked that her father be discharged to [facility] in [facility]. CM informed her that patient just had surgery and would not be able to tolerate the 3 hours of intensive physical therapy required to meet their criteria. Daughter was not pleased to know that patient could not go to [facility]. I gave her a choice of SNF, she said [facility], then asked for a second choice if [facility] did not have a bed. She declined anywhere else. CM sent a referral to [facility] via [employer] and spoke with Roe Mast in Admissions [phone] regarding referral. She said she would review the clinicals and will call this CM back today. CM will continue to follow. Luis Thorne MSA, RN, CRM    3:45 PM. [facility] [place], [facility]. is willing to accept patient when medically stable.   Luis Thorne MSA RN, CRM

## 2018-04-11 NOTE — ANESTHESIA POSTPROCEDURE EVALUATION
Post-Anesthesia Evaluation and Assessment    Patient: Stephanie Kwon MRN: [id number]  SSN: [id number]    YOB: 1955  Age: 60 y.o. Sex: male       Cardiovascular Function/Vital Signs  Visit Vitals    /60    Pulse 69    Temp 36.7 °C (98 °F)    Resp 21    Ht 5' 4\" (1.626 m)    Wt 62 kg (136 lb 11 oz)    SpO2 99%    BMI 23.46 kg/m2       Patient is status post MAC anesthesia for Procedure(s):  RIGHT LEG WOUND CLOSURE (LOCAL WITH MAC). Nausea/Vomiting: None    Postoperative hydration reviewed and adequate. Pain:  Pain Scale 1: Numeric (0 - 10) (04/11/18 1055)  Pain Intensity 1: 3 (04/11/18 1055)   Managed    Neurological Status:   Neuro (WDL): Exceptions to WDL (04/11/18 1055)  Neuro  Neurologic State: Drowsy; Eyes open spontaneously; Eyes open to stimulus (04/11/18 1055)  Orientation Level: Disoriented to situation;Disoriented to time;Oriented to person;Oriented to place (04/11/18 1055)  Cognition: Decreased command following;Decreased attention/concentration; Impaired decision making (04/11/18 1055)  Speech: Appropriate for age;Clear (04/11/18 1055)  LUE Motor Response: Purposeful (04/11/18 1055)  LLE Motor Response: Purposeful (04/11/18 1055)  RUE Motor Response: Purposeful (04/11/18 1055)  RLE Motor Response: Purposeful (04/11/18 1055)   At baseline    Mental Status and Level of Consciousness: Arousable    Pulmonary Status:   O2 Device: Nasal cannula (04/11/18 1056)   Adequate oxygenation and airway patent    Complications related to anesthesia: None    Post-anesthesia assessment completed.  No concerns    Signed By: Carmen Roberts MD     April 11, 2018

## 2018-04-11 NOTE — WOUND CARE
WOCN Note:     New consult placed by RN for sacrum. Chart shows:  Admitted for leg pain after fall about 6 days ago now leg is cool to touch and no palpable pulse - fasciotomy 4/7 with closure 4/10; history of new onset dementia, CHF, smlker    Assessment:   Patient is communicative, continent and mobile - turned independently in bed for assessment. Bed: total care  Patient reports no pain. Bilateral heel and sacral skin intact and without erythema. Lower leg is wrapped with dressing and managed by vascular team.    Right medial buttock with friction damage = 1.2 x 0 x 0.1 cm Very dry lacy edges 50% pale pink centrally surrounded by 50% blanching red. No exudate. Petroleum ointment applied with Mepilex border to cover and protect. Patient repositioned on left side. Wound Recommendations:    Left buttocks friction damage: please apply thin smear of petroleum ointment daily and cover with Mepilex border. Change Mepilex as needed. Skin Care & Pressure Relief Recommendations:  Minimize layers of linen/pads under patient to optimize support surface. Turn/reposition approximately every 2 hours and offload heels. Manage incontinence / promote continence    Discussed above plan with patient.     Transition of Care: Plan to follow weekly and as needed while admitted to hospital.    JOAQUÍN Mustafa, RN, [facility]  Certified Wound, Ostomy, Continence Nurse  office [phone]  pager [phone] or call  to page

## 2018-04-11 NOTE — PROGRESS NOTES
TRANSFER - IN REPORT:    Verbal report received from Shilpa(name) on Sheri Sevilla  being received from [employer]) for routine progression of care      Report consisted of patients Situation, Background, Assessment and   Recommendations(SBAR). Information from the following report(s) SBAR and OR Summary was reviewed with the receiving nurse. Opportunity for questions and clarification was provided. Assessment completed upon patients arrival to unit and care assumed.

## 2018-04-11 NOTE — ROUTINE PROCESS
TRANSFER - IN REPORT:    Verbal report received from JOELLE Feliciano, on Claudette Form  being received from [facility] FOR CONTINUING [facility] [place] for routine progression of care      Report consisted of patients Situation, Background, Assessment and   Recommendations(SBAR). Information from the following report(s) SBAR was reviewed with the receiving nurse. Opportunity for questions and clarification was provided. Assessment completed upon patients arrival to unit and care assumed.

## 2018-04-11 NOTE — ANESTHESIA PREPROCEDURE EVALUATION
Anesthetic History   No history of anesthetic complications            Review of Systems / Medical History  Patient summary reviewed, nursing notes reviewed and pertinent labs reviewed    Pulmonary    COPD: mild               Neuro/Psych         Dementia     Cardiovascular          CHF: NYHA Classification II  Dysrhythmias : atrial fibrillation  CAD      Comments: EF 20%   GI/Hepatic/Renal     GERD           Endo/Other  Within defined limits           Other Findings            Physical Exam    Airway  Mallampati: II  TM Distance: > 6 cm  Neck ROM: normal range of motion   Mouth opening: Normal     Cardiovascular  Regular rate and rhythm,  S1 and S2 normal,  no murmur, click, rub, or gallop             Dental  No notable dental hx       Pulmonary  Breath sounds clear to auscultation               Abdominal  GI exam deferred       Other Findings            Anesthetic Plan    ASA: 3  Anesthesia type: MAC          Induction: Intravenous  Anesthetic plan and risks discussed with: Patient

## 2018-04-12 VITALS
DIASTOLIC BLOOD PRESSURE: 104 MMHG | TEMPERATURE: 97.8 F | HEIGHT: 64 IN | BODY MASS INDEX: 22.5 KG/M2 | OXYGEN SATURATION: 100 % | HEART RATE: 69 BPM | SYSTOLIC BLOOD PRESSURE: 148 MMHG | RESPIRATION RATE: 18 BRPM | WEIGHT: 131.8 LBS

## 2018-04-12 PROCEDURE — 74011250637 HC RX REV CODE- 250/637: Performed by: SPECIALIST

## 2018-04-12 PROCEDURE — 74011250637 HC RX REV CODE- 250/637: Performed by: SURGERY

## 2018-04-12 RX ORDER — GUAIFENESIN 100 MG/5ML
81 LIQUID (ML) ORAL DAILY
Qty: 120 TAB | Refills: 0 | Status: SHIPPED | OUTPATIENT
Start: 2018-04-12

## 2018-04-12 RX ORDER — GABAPENTIN 100 MG/1
100 CAPSULE ORAL 3 TIMES DAILY
Qty: 90 CAP | Refills: 3 | Status: SHIPPED | OUTPATIENT
Start: 2018-04-12

## 2018-04-12 RX ORDER — CARVEDILOL 3.12 MG/1
3.12 TABLET ORAL 2 TIMES DAILY WITH MEALS
Qty: 180 TAB | Refills: 3 | Status: SHIPPED | OUTPATIENT
Start: 2018-04-12

## 2018-04-12 RX ORDER — HYDROCODONE BITARTRATE AND ACETAMINOPHEN 7.5; 325 MG/1; MG/1
1 TABLET ORAL
Qty: 75 TAB | Refills: 0 | Status: SHIPPED | OUTPATIENT
Start: 2018-04-12

## 2018-04-12 RX ADMIN — Medication 5 ML: at 06:00

## 2018-04-12 RX ADMIN — LISINOPRIL 2.5 MG: 5 TABLET ORAL at 09:06

## 2018-04-12 RX ADMIN — GABAPENTIN 100 MG: 100 CAPSULE ORAL at 09:05

## 2018-04-12 RX ADMIN — CARVEDILOL 3.12 MG: 3.12 TABLET, FILM COATED ORAL at 09:05

## 2018-04-12 RX ADMIN — ASPIRIN 81 MG 81 MG: 81 TABLET ORAL at 09:05

## 2018-04-12 RX ADMIN — HYDROCODONE BITARTRATE AND ACETAMINOPHEN 1 TABLET: 7.5; 325 TABLET ORAL at 05:10

## 2018-04-12 RX ADMIN — HYDROCODONE BITARTRATE AND ACETAMINOPHEN 1 TABLET: 7.5; 325 TABLET ORAL at 09:05

## 2018-04-12 RX ADMIN — APIXABAN 5 MG: 5 TABLET, FILM COATED ORAL at 09:05

## 2018-04-12 RX ADMIN — HYDROCODONE BITARTRATE AND ACETAMINOPHEN 1 TABLET: 7.5; 325 TABLET ORAL at 01:04

## 2018-04-12 NOTE — PROGRESS NOTES
Hospital PCP MIKE from SNF follow-up appointment with Dr. Sanna Hamilton on Thursday May 3, 2018  @ 10:00 a.m.  Added to AVS.   Jamari Mack CM Specialist

## 2018-04-12 NOTE — PROGRESS NOTES
Bedside shift change report given to Daniel Arizmendi 1313 (oncoming nurse) by Prudencio Jean-Baptiste RN (offgoing nurse). Report included the following information SBAR, Kardex, MAR, Recent Results and Cardiac Rhythm NSR,PVC.

## 2018-04-12 NOTE — DISCHARGE SUMMARY
Physician Discharge Summary     Patient ID:  Haroon Nguyen  [id number]  14 y.o.  1955    Allergies: Codeine and Morphine    Admit date: 4/7/2018    Discharge date: 4/12/2018      Admitting Physician: Nena Kenny MD     Discharge Physician: Nena Kenny MD    * Admission Diagnoses: right femoral clot  Ischemia of right lower extremity  RIGHT LEG WOUND     * Discharge Diagnoses:   Hospital Problems as of 4/12/2018  Date Reviewed: 12/18/2016          Codes Class Noted - Resolved POA    Ischemia of right lower extremity ICD-10-CM: I99.8  ICD-9-CM: 459.9  4/7/2018 - Present Unknown              * Procedures for this admission: Procedure(s):  RIGHT LEG WOUND CLOSURE (LOCAL WITH MAC)      * Discharged Condition: improved    * Hospital Course: Mr. Radha Day was urgently transferred to [facility] for acute right lower extremity ischemia. He was found to have an aortobifemoral limb occlusion. This was opened and had to be stented. His post op course was uncomplicated. His fasciotomy sites were closed several days after surgery. He was working with PT. His major difficulty is his dementia and he is having improving leg pain that he has a hard time verbalizing. Consults: Cardiology    Discharge Exam: Awake and not oriented. RLE warm and dry. Incision in groin is soft and clean. Fasciotomy sites are clean and dry. PT signal at the foot. * Disposition: [place] ([facility])    Discharge Medications:   Current Discharge Medication List      START taking these medications    Details   apixaban (ELIQUIS) 5 mg tablet Take 1 Tab by mouth two (2) times a day. Qty: 180 Tab, Refills: 0    Associated Diagnoses: Ischemia of right lower extremity      aspirin 81 mg chewable tablet Take 1 Tab by mouth daily. Qty: 120 Tab, Refills: 0    Associated Diagnoses: Ischemia of right lower extremity      carvedilol (COREG) 3.125 mg tablet Take 1 Tab by mouth two (2) times daily (with meals).   Qty: 180 Tab, Refills: 3 Associated Diagnoses: Ischemia of right lower extremity      gabapentin (NEURONTIN) 100 mg capsule Take 1 Cap by mouth three (3) times daily. Qty: 90 Cap, Refills: 3    Associated Diagnoses: Ischemia of right lower extremity      HYDROcodone-acetaminophen (NORCO) 7.5-325 mg per tablet Take 1 Tab by mouth every four (4) hours as needed. Max Daily Amount: 6 Tabs. Qty: 75 Tab, Refills: 0    Associated Diagnoses: Ischemia of right lower extremity         CONTINUE these medications which have NOT CHANGED    Details   therapeutic multivitamin (THERAGRAN) tablet Take 1 Tab by mouth daily. lisinopril (PRINIVIL, ZESTRIL) 10 mg tablet Take 10 mg by mouth daily. STOP taking these medications       oxyCODONE-acetaminophen (PERCOCET) 5-325 mg per tablet Comments:   Reason for Stopping:               * Follow-up Care/Patient Instructions: Activity: Activity as tolerated  Diet: Regular Diet  Wound Care: Keep wound clean and dry    Follow-up Information     Follow up With Details Comments [address]    Charlie Stallworth MD In 2 weeks  [address] [facility] [id number] [phone]            Signed:   Charlie Stallworth MD  4/12/2018  7:30 AM

## 2018-04-12 NOTE — ROUTINE PROCESS
Verbal shift change report given to Tano Starks (oncoming nurse) by Kira Maher RN (offgoing nurse). Report included the following information SBAR, Kardex, MAR and Cardiac Rhythm nsr.

## 2018-04-12 NOTE — PROGRESS NOTES
Vascular  Says pain is improved  Visit Vitals    BP (!) 130/98    Pulse 74    Temp 98 °F (36.7 °C)    Resp 18    Ht 5' 4\" (1.626 m)    Wt 59.8 kg (131 lb 12.8 oz)    SpO2 100%    BMI 22.62 kg/m2     Right foot hot and dry  Groin incision clean  Fasciotomy sites dressed        59 y/o WM with acute RLE ischemia, s/p thrombectomy and stenting  - Foot hot with good signals.  Fasciotomy sites dressed  - Pain seems better  - Eliquis for three months  - PT/OT- has bed available will but in d/c orders

## 2018-04-12 NOTE — PROGRESS NOTES
CM noted order for discharge, called Bailey Lawrence with [facility] [place], [facility]. to notify her of discharge today. They are willing to accept patient today. Nurse to call report to [phone] for patient going into room 240 A. CM to fax report to [phone]. CM called patient's daughter Stephanie Osuna to notify her of discharge to SNF. Patient in severe pain 10/10 and having difficulty walking on his Right leg. CM discussed transporting patient via an ambulance, also notified daughter that patient will be responsible for the cost of trip if not covered by Medicare. Daughter said she is willing to pay, she did not have a preference therefore a referral was sent via [employer] to [address] [place] [address] for a 12 noon  time. Daughter agreeable with SNF placement and today's discharge. Freedom of Choice form signed and placed in patient's chart. Candice Garduno MSA, RN, CRM.    11`:22 am. [facility] will pick patient up at 12 noon. Candice Garduno MSA, RN, CRM.

## 2018-04-12 NOTE — PROGRESS NOTES
Assumed care of pt this am. Pt is alert but not fully oriented at this time. Pt had a 15 beat run of V tach this morning(no symptoms), MD notified, no new orders at this timeTRANSFER - OUT REPORT:    Verbal report given to kathy(name) on Gwenith Coral  being transferred to [facility](unit) for routine progression of care       Report consisted of patients Situation, Background, Assessment and   Recommendations(SBAR). Information from the following report(s) SBAR, Kardex, OR Summary, Procedure Summary, Intake/Output, MAR and Recent Results was reviewed with the receiving nurse. Opportunity for questions and clarification was provided. Nurse given phone number for unit for any questions. Pt is unable to sign his discharge information due to mental status. Pt is now being discharged.

## 2018-10-10 ENCOUNTER — ED HISTORICAL/CONVERTED ENCOUNTER (OUTPATIENT)
Dept: OTHER | Age: 63
End: 2018-10-10

## 2018-10-10 ENCOUNTER — TELEPHONE (OUTPATIENT)
Dept: CARDIOLOGY CLINIC | Age: 63
End: 2018-10-10

## 2018-10-10 NOTE — TELEPHONE ENCOUNTER
Received a call from nurse at [facility] requesting the  of patient's ICD. Notified nurse that we have not seen patient since generator change on 3/10/15. We have attempted to get in touch with patient multiple times for appointments. Patient no showed multiple appointments. Notified nurse that per Dr. Roberts McLoud procedure note the device company is [address] [place] [address]. Nurse states that they will call [employer] rep.

## 2019-02-24 ENCOUNTER — ED HISTORICAL/CONVERTED ENCOUNTER (OUTPATIENT)
Dept: OTHER | Age: 64
End: 2019-02-24

## 2019-07-08 ENCOUNTER — ED HISTORICAL/CONVERTED ENCOUNTER (OUTPATIENT)
Dept: OTHER | Age: 64
End: 2019-07-08

## 2019-07-09 ENCOUNTER — ANESTHESIA EVENT (OUTPATIENT)
Dept: SURGERY | Age: 64
DRG: 253 | End: 2019-07-09
Payer: MEDICARE

## 2019-07-09 ENCOUNTER — APPOINTMENT (OUTPATIENT)
Dept: NON INVASIVE DIAGNOSTICS | Age: 64
DRG: 253 | End: 2019-07-09
Attending: SURGERY
Payer: MEDICARE

## 2019-07-09 ENCOUNTER — APPOINTMENT (OUTPATIENT)
Dept: GENERAL RADIOLOGY | Age: 64
DRG: 253 | End: 2019-07-09
Attending: SURGERY
Payer: MEDICARE

## 2019-07-09 ENCOUNTER — HOSPITAL ENCOUNTER (INPATIENT)
Age: 64
LOS: 3 days | Discharge: NURSING, REHAB OR LONG-TERM CARE | DRG: 253 | End: 2019-07-12
Attending: EMERGENCY MEDICINE | Admitting: SURGERY
Payer: MEDICARE

## 2019-07-09 ENCOUNTER — ANESTHESIA (OUTPATIENT)
Dept: SURGERY | Age: 64
DRG: 253 | End: 2019-07-09
Payer: MEDICARE

## 2019-07-09 DIAGNOSIS — I99.8 ISCHEMIC FOOT: Primary | ICD-10-CM

## 2019-07-09 PROBLEM — M79.605 LEG PAIN, LEFT: Status: ACTIVE | Noted: 2019-07-09

## 2019-07-09 LAB
ABO + RH BLD: NORMAL
ALBUMIN SERPL-MCNC: 2.5 G/DL (ref 3.5–5)
ALBUMIN/GLOB SERPL: 0.9 {RATIO} (ref 1.1–2.2)
ALP SERPL-CCNC: 92 U/L (ref 45–117)
ALT SERPL-CCNC: 16 U/L (ref 12–78)
ANION GAP SERPL CALC-SCNC: 7 MMOL/L (ref 5–15)
ANION GAP SERPL CALC-SCNC: 8 MMOL/L (ref 5–15)
APTT PPP: 77 SEC (ref 22.1–32)
APTT PPP: 95.8 SEC (ref 22.1–32)
AST SERPL-CCNC: 36 U/L (ref 15–37)
BASOPHILS # BLD: 0 K/UL (ref 0–0.1)
BASOPHILS NFR BLD: 0 % (ref 0–1)
BILIRUB SERPL-MCNC: 0.5 MG/DL (ref 0.2–1)
BLOOD GROUP ANTIBODIES SERPL: NORMAL
BUN SERPL-MCNC: 7 MG/DL (ref 6–20)
BUN SERPL-MCNC: 8 MG/DL (ref 6–20)
BUN/CREAT SERPL: 10 (ref 12–20)
BUN/CREAT SERPL: 15 (ref 12–20)
CALCIUM SERPL-MCNC: 6.6 MG/DL (ref 8.5–10.1)
CALCIUM SERPL-MCNC: 7.8 MG/DL (ref 8.5–10.1)
CHLORIDE SERPL-SCNC: 110 MMOL/L (ref 97–108)
CHLORIDE SERPL-SCNC: 116 MMOL/L (ref 97–108)
CO2 SERPL-SCNC: 21 MMOL/L (ref 21–32)
CO2 SERPL-SCNC: 22 MMOL/L (ref 21–32)
CREAT SERPL-MCNC: 0.55 MG/DL (ref 0.7–1.3)
CREAT SERPL-MCNC: 0.72 MG/DL (ref 0.7–1.3)
DIFFERENTIAL METHOD BLD: ABNORMAL
EOSINOPHIL # BLD: 0.1 K/UL (ref 0–0.4)
EOSINOPHIL NFR BLD: 1 % (ref 0–7)
ERYTHROCYTE [DISTWIDTH] IN BLOOD BY AUTOMATED COUNT: 13.4 % (ref 11.5–14.5)
ERYTHROCYTE [DISTWIDTH] IN BLOOD BY AUTOMATED COUNT: 13.4 % (ref 11.5–14.5)
GLOBULIN SER CALC-MCNC: 2.8 G/DL (ref 2–4)
GLUCOSE SERPL-MCNC: 125 MG/DL (ref 65–100)
GLUCOSE SERPL-MCNC: 93 MG/DL (ref 65–100)
HCT VFR BLD AUTO: 41.8 % (ref 36.6–50.3)
HCT VFR BLD AUTO: 44.9 % (ref 36.6–50.3)
HGB BLD-MCNC: 13.3 G/DL (ref 12.1–17)
HGB BLD-MCNC: 14.4 G/DL (ref 12.1–17)
IMM GRANULOCYTES # BLD AUTO: 0.1 K/UL (ref 0–0.04)
IMM GRANULOCYTES NFR BLD AUTO: 1 % (ref 0–0.5)
LYMPHOCYTES # BLD: 1.1 K/UL (ref 0.8–3.5)
LYMPHOCYTES NFR BLD: 10 % (ref 12–49)
MCH RBC QN AUTO: 31.7 PG (ref 26–34)
MCH RBC QN AUTO: 31.7 PG (ref 26–34)
MCHC RBC AUTO-ENTMCNC: 31.8 G/DL (ref 30–36.5)
MCHC RBC AUTO-ENTMCNC: 32.1 G/DL (ref 30–36.5)
MCV RBC AUTO: 98.9 FL (ref 80–99)
MCV RBC AUTO: 99.8 FL (ref 80–99)
MONOCYTES # BLD: 0.8 K/UL (ref 0–1)
MONOCYTES NFR BLD: 8 % (ref 5–13)
NEUTS SEG # BLD: 8.3 K/UL (ref 1.8–8)
NEUTS SEG NFR BLD: 80 % (ref 32–75)
NRBC # BLD: 0 K/UL (ref 0–0.01)
NRBC # BLD: 0 K/UL (ref 0–0.01)
NRBC BLD-RTO: 0 PER 100 WBC
NRBC BLD-RTO: 0 PER 100 WBC
PLATELET # BLD AUTO: 82 K/UL (ref 150–400)
PLATELET # BLD AUTO: 86 K/UL (ref 150–400)
PMV BLD AUTO: 11.6 FL (ref 8.9–12.9)
PMV BLD AUTO: 11.7 FL (ref 8.9–12.9)
POTASSIUM SERPL-SCNC: 3.6 MMOL/L (ref 3.5–5.1)
POTASSIUM SERPL-SCNC: 4.2 MMOL/L (ref 3.5–5.1)
PROT SERPL-MCNC: 5.3 G/DL (ref 6.4–8.2)
RBC # BLD AUTO: 4.19 M/UL (ref 4.1–5.7)
RBC # BLD AUTO: 4.54 M/UL (ref 4.1–5.7)
SODIUM SERPL-SCNC: 140 MMOL/L (ref 136–145)
SODIUM SERPL-SCNC: 144 MMOL/L (ref 136–145)
SPECIMEN EXP DATE BLD: NORMAL
THERAPEUTIC RANGE,PTTT: ABNORMAL SECS (ref 58–77)
THERAPEUTIC RANGE,PTTT: ABNORMAL SECS (ref 58–77)
WBC # BLD AUTO: 10.2 K/UL (ref 4.1–11.1)
WBC # BLD AUTO: 10.3 K/UL (ref 4.1–11.1)

## 2019-07-09 PROCEDURE — B41G1ZZ FLUOROSCOPY OF LEFT LOWER EXTREMITY ARTERIES USING LOW OSMOLAR CONTRAST: ICD-10-PCS | Performed by: SURGERY

## 2019-07-09 PROCEDURE — 77030008684 HC TU ET CUF COVD -B: Performed by: ANESTHESIOLOGY

## 2019-07-09 PROCEDURE — 80053 COMPREHEN METABOLIC PANEL: CPT

## 2019-07-09 PROCEDURE — 3E05017 INTRODUCTION OF OTHER THROMBOLYTIC INTO PERIPHERAL ARTERY, OPEN APPROACH: ICD-10-PCS | Performed by: SURGERY

## 2019-07-09 PROCEDURE — 74011250636 HC RX REV CODE- 250/636: Performed by: SURGERY

## 2019-07-09 PROCEDURE — 77030013079 HC BLNKT BAIR HGGR 3M -A: Performed by: ANESTHESIOLOGY

## 2019-07-09 PROCEDURE — 85730 THROMBOPLASTIN TIME PARTIAL: CPT

## 2019-07-09 PROCEDURE — 76010000133 HC OR TIME 3 TO 3.5 HR: Performed by: SURGERY

## 2019-07-09 PROCEDURE — 77030026438 HC STYL ET INTUB CARD -A: Performed by: ANESTHESIOLOGY

## 2019-07-09 PROCEDURE — C1769 GUIDE WIRE: HCPCS | Performed by: SURGERY

## 2019-07-09 PROCEDURE — 04CN0ZZ EXTIRPATION OF MATTER FROM LEFT POPLITEAL ARTERY, OPEN APPROACH: ICD-10-PCS | Performed by: SURGERY

## 2019-07-09 PROCEDURE — 77030031139 HC SUT VCRL2 J&J -A: Performed by: SURGERY

## 2019-07-09 PROCEDURE — P9045 ALBUMIN (HUMAN), 5%, 250 ML: HCPCS

## 2019-07-09 PROCEDURE — 99284 EMERGENCY DEPT VISIT MOD MDM: CPT

## 2019-07-09 PROCEDURE — 77030011640 HC PAD GRND REM COVD -A: Performed by: SURGERY

## 2019-07-09 PROCEDURE — 76210000001 HC OR PH I REC 2.5 TO 3 HR: Performed by: SURGERY

## 2019-07-09 PROCEDURE — 74011000250 HC RX REV CODE- 250: Performed by: SURGERY

## 2019-07-09 PROCEDURE — 36415 COLL VENOUS BLD VENIPUNCTURE: CPT

## 2019-07-09 PROCEDURE — 74011636320 HC RX REV CODE- 636/320: Performed by: SURGERY

## 2019-07-09 PROCEDURE — 65660000000 HC RM CCU STEPDOWN

## 2019-07-09 PROCEDURE — C8929 TTE W OR WO FOL WCON,DOPPLER: HCPCS

## 2019-07-09 PROCEDURE — 77030039266 HC ADH SKN EXOFIN S2SG -A: Performed by: SURGERY

## 2019-07-09 PROCEDURE — 77030002986 HC SUT PROL J&J -A: Performed by: SURGERY

## 2019-07-09 PROCEDURE — 85027 COMPLETE CBC AUTOMATED: CPT

## 2019-07-09 PROCEDURE — 76060000037 HC ANESTHESIA 3 TO 3.5 HR: Performed by: SURGERY

## 2019-07-09 PROCEDURE — C1757 CATH, THROMBECTOMY/EMBOLECT: HCPCS | Performed by: SURGERY

## 2019-07-09 PROCEDURE — 74011250636 HC RX REV CODE- 250/636

## 2019-07-09 PROCEDURE — 04SL0ZZ REPOSITION LEFT FEMORAL ARTERY, OPEN APPROACH: ICD-10-PCS | Performed by: SURGERY

## 2019-07-09 PROCEDURE — 94762 N-INVAS EAR/PLS OXIMTRY CONT: CPT

## 2019-07-09 PROCEDURE — 74011000250 HC RX REV CODE- 250

## 2019-07-09 PROCEDURE — 0KNR0ZZ RELEASE LEFT UPPER LEG MUSCLE, OPEN APPROACH: ICD-10-PCS | Performed by: SURGERY

## 2019-07-09 PROCEDURE — 85025 COMPLETE CBC W/AUTO DIFF WBC: CPT

## 2019-07-09 PROCEDURE — 74011250637 HC RX REV CODE- 250/637: Performed by: SURGERY

## 2019-07-09 PROCEDURE — 86900 BLOOD TYPING SEROLOGIC ABO: CPT

## 2019-07-09 PROCEDURE — 77030020256 HC SOL INJ NACL 0.9%  500ML: Performed by: SURGERY

## 2019-07-09 PROCEDURE — 77030034850: Performed by: SURGERY

## 2019-07-09 PROCEDURE — 04CL0ZZ EXTIRPATION OF MATTER FROM LEFT FEMORAL ARTERY, OPEN APPROACH: ICD-10-PCS | Performed by: SURGERY

## 2019-07-09 PROCEDURE — 041L0JL BYPASS LEFT FEMORAL ARTERY TO POPLITEAL ARTERY WITH SYNTHETIC SUBSTITUTE, OPEN APPROACH: ICD-10-PCS | Performed by: SURGERY

## 2019-07-09 RX ORDER — SODIUM CHLORIDE 9 MG/ML
50 INJECTION, SOLUTION INTRAVENOUS CONTINUOUS
Status: DISCONTINUED | OUTPATIENT
Start: 2019-07-09 | End: 2019-07-10

## 2019-07-09 RX ORDER — SODIUM CHLORIDE 0.9 % (FLUSH) 0.9 %
5-40 SYRINGE (ML) INJECTION AS NEEDED
Status: DISCONTINUED | OUTPATIENT
Start: 2019-07-09 | End: 2019-07-09 | Stop reason: HOSPADM

## 2019-07-09 RX ORDER — SUCCINYLCHOLINE CHLORIDE 20 MG/ML
INJECTION INTRAMUSCULAR; INTRAVENOUS AS NEEDED
Status: DISCONTINUED | OUTPATIENT
Start: 2019-07-09 | End: 2019-07-09 | Stop reason: HOSPADM

## 2019-07-09 RX ORDER — ALBUMIN HUMAN 50 G/1000ML
SOLUTION INTRAVENOUS AS NEEDED
Status: DISCONTINUED | OUTPATIENT
Start: 2019-07-09 | End: 2019-07-09 | Stop reason: HOSPADM

## 2019-07-09 RX ORDER — ACETAMINOPHEN 325 MG/1
650 TABLET ORAL ONCE
Status: DISCONTINUED | OUTPATIENT
Start: 2019-07-09 | End: 2019-07-09 | Stop reason: HOSPADM

## 2019-07-09 RX ORDER — OXYCODONE HYDROCHLORIDE 5 MG/1
5 TABLET ORAL AS NEEDED
Status: DISCONTINUED | OUTPATIENT
Start: 2019-07-09 | End: 2019-07-09 | Stop reason: HOSPADM

## 2019-07-09 RX ORDER — SODIUM CHLORIDE, SODIUM LACTATE, POTASSIUM CHLORIDE, CALCIUM CHLORIDE 600; 310; 30; 20 MG/100ML; MG/100ML; MG/100ML; MG/100ML
125 INJECTION, SOLUTION INTRAVENOUS CONTINUOUS
Status: DISCONTINUED | OUTPATIENT
Start: 2019-07-09 | End: 2019-07-09 | Stop reason: HOSPADM

## 2019-07-09 RX ORDER — SODIUM CHLORIDE, SODIUM LACTATE, POTASSIUM CHLORIDE, CALCIUM CHLORIDE 600; 310; 30; 20 MG/100ML; MG/100ML; MG/100ML; MG/100ML
INJECTION, SOLUTION INTRAVENOUS
Status: DISCONTINUED | OUTPATIENT
Start: 2019-07-09 | End: 2019-07-09 | Stop reason: HOSPADM

## 2019-07-09 RX ORDER — PROPOFOL 10 MG/ML
INJECTION, EMULSION INTRAVENOUS AS NEEDED
Status: DISCONTINUED | OUTPATIENT
Start: 2019-07-09 | End: 2019-07-09 | Stop reason: HOSPADM

## 2019-07-09 RX ORDER — ACETAMINOPHEN 325 MG/1
650 TABLET ORAL
Status: DISCONTINUED | OUTPATIENT
Start: 2019-07-09 | End: 2019-07-12 | Stop reason: HOSPADM

## 2019-07-09 RX ORDER — HEPARIN SODIUM 10000 [USP'U]/100ML
12-25 INJECTION, SOLUTION INTRAVENOUS
Status: DISCONTINUED | OUTPATIENT
Start: 2019-07-09 | End: 2019-07-09 | Stop reason: CLARIF

## 2019-07-09 RX ORDER — MIDAZOLAM HYDROCHLORIDE 1 MG/ML
1 INJECTION, SOLUTION INTRAMUSCULAR; INTRAVENOUS
Status: DISCONTINUED | OUTPATIENT
Start: 2019-07-09 | End: 2019-07-09 | Stop reason: HOSPADM

## 2019-07-09 RX ORDER — OXYCODONE AND ACETAMINOPHEN 5; 325 MG/1; MG/1
1 TABLET ORAL
Status: DISCONTINUED | OUTPATIENT
Start: 2019-07-09 | End: 2019-07-12 | Stop reason: HOSPADM

## 2019-07-09 RX ORDER — SODIUM CHLORIDE 0.9 % (FLUSH) 0.9 %
30 SYRINGE (ML) INJECTION
Status: ACTIVE | OUTPATIENT
Start: 2019-07-09 | End: 2019-07-09

## 2019-07-09 RX ORDER — CARVEDILOL 3.12 MG/1
3.12 TABLET ORAL 2 TIMES DAILY WITH MEALS
Status: DISCONTINUED | OUTPATIENT
Start: 2019-07-09 | End: 2019-07-12 | Stop reason: HOSPADM

## 2019-07-09 RX ORDER — HEPARIN SODIUM 1000 [USP'U]/ML
INJECTION, SOLUTION INTRAVENOUS; SUBCUTANEOUS AS NEEDED
Status: DISCONTINUED | OUTPATIENT
Start: 2019-07-09 | End: 2019-07-09 | Stop reason: HOSPADM

## 2019-07-09 RX ORDER — HYDROMORPHONE HYDROCHLORIDE 2 MG/ML
INJECTION, SOLUTION INTRAMUSCULAR; INTRAVENOUS; SUBCUTANEOUS AS NEEDED
Status: DISCONTINUED | OUTPATIENT
Start: 2019-07-09 | End: 2019-07-09 | Stop reason: HOSPADM

## 2019-07-09 RX ORDER — HEPARIN SODIUM 10000 [USP'U]/100ML
18-36 INJECTION, SOLUTION INTRAVENOUS
Status: DISCONTINUED | OUTPATIENT
Start: 2019-07-09 | End: 2019-07-11

## 2019-07-09 RX ORDER — ROCURONIUM BROMIDE 10 MG/ML
INJECTION, SOLUTION INTRAVENOUS AS NEEDED
Status: DISCONTINUED | OUTPATIENT
Start: 2019-07-09 | End: 2019-07-09 | Stop reason: HOSPADM

## 2019-07-09 RX ORDER — SODIUM CHLORIDE 0.9 % (FLUSH) 0.9 %
5-40 SYRINGE (ML) INJECTION AS NEEDED
Status: DISCONTINUED | OUTPATIENT
Start: 2019-07-09 | End: 2019-07-12 | Stop reason: HOSPADM

## 2019-07-09 RX ORDER — DEXMEDETOMIDINE HYDROCHLORIDE 4 UG/ML
INJECTION, SOLUTION INTRAVENOUS AS NEEDED
Status: DISCONTINUED | OUTPATIENT
Start: 2019-07-09 | End: 2019-07-09 | Stop reason: HOSPADM

## 2019-07-09 RX ORDER — ESMOLOL HYDROCHLORIDE 10 MG/ML
INJECTION INTRAVENOUS AS NEEDED
Status: DISCONTINUED | OUTPATIENT
Start: 2019-07-09 | End: 2019-07-09 | Stop reason: HOSPADM

## 2019-07-09 RX ORDER — MIDAZOLAM HYDROCHLORIDE 1 MG/ML
1 INJECTION, SOLUTION INTRAMUSCULAR; INTRAVENOUS AS NEEDED
Status: DISCONTINUED | OUTPATIENT
Start: 2019-07-09 | End: 2019-07-09 | Stop reason: HOSPADM

## 2019-07-09 RX ORDER — FENTANYL CITRATE 50 UG/ML
25 INJECTION, SOLUTION INTRAMUSCULAR; INTRAVENOUS
Status: DISCONTINUED | OUTPATIENT
Start: 2019-07-09 | End: 2019-07-09 | Stop reason: HOSPADM

## 2019-07-09 RX ORDER — SODIUM CHLORIDE 0.9 % (FLUSH) 0.9 %
5-40 SYRINGE (ML) INJECTION EVERY 8 HOURS
Status: DISCONTINUED | OUTPATIENT
Start: 2019-07-09 | End: 2019-07-09 | Stop reason: HOSPADM

## 2019-07-09 RX ORDER — DIPHENHYDRAMINE HYDROCHLORIDE 50 MG/ML
12.5 INJECTION, SOLUTION INTRAMUSCULAR; INTRAVENOUS AS NEEDED
Status: DISCONTINUED | OUTPATIENT
Start: 2019-07-09 | End: 2019-07-09 | Stop reason: HOSPADM

## 2019-07-09 RX ORDER — DEXTROSE, SODIUM CHLORIDE, SODIUM LACTATE, POTASSIUM CHLORIDE, AND CALCIUM CHLORIDE 5; .6; .31; .03; .02 G/100ML; G/100ML; G/100ML; G/100ML; G/100ML
125 INJECTION, SOLUTION INTRAVENOUS CONTINUOUS
Status: DISCONTINUED | OUTPATIENT
Start: 2019-07-09 | End: 2019-07-09 | Stop reason: HOSPADM

## 2019-07-09 RX ORDER — DEXAMETHASONE SODIUM PHOSPHATE 4 MG/ML
INJECTION, SOLUTION INTRA-ARTICULAR; INTRALESIONAL; INTRAMUSCULAR; INTRAVENOUS; SOFT TISSUE AS NEEDED
Status: DISCONTINUED | OUTPATIENT
Start: 2019-07-09 | End: 2019-07-09 | Stop reason: HOSPADM

## 2019-07-09 RX ORDER — FENTANYL CITRATE 50 UG/ML
INJECTION, SOLUTION INTRAMUSCULAR; INTRAVENOUS AS NEEDED
Status: DISCONTINUED | OUTPATIENT
Start: 2019-07-09 | End: 2019-07-09 | Stop reason: HOSPADM

## 2019-07-09 RX ORDER — LIDOCAINE HYDROCHLORIDE 10 MG/ML
0.5 INJECTION, SOLUTION EPIDURAL; INFILTRATION; INTRACAUDAL; PERINEURAL AS NEEDED
Status: DISCONTINUED | OUTPATIENT
Start: 2019-07-09 | End: 2019-07-09 | Stop reason: HOSPADM

## 2019-07-09 RX ORDER — LIDOCAINE HYDROCHLORIDE 20 MG/ML
INJECTION, SOLUTION EPIDURAL; INFILTRATION; INTRACAUDAL; PERINEURAL AS NEEDED
Status: DISCONTINUED | OUTPATIENT
Start: 2019-07-09 | End: 2019-07-09 | Stop reason: HOSPADM

## 2019-07-09 RX ORDER — ONDANSETRON 2 MG/ML
INJECTION INTRAMUSCULAR; INTRAVENOUS AS NEEDED
Status: DISCONTINUED | OUTPATIENT
Start: 2019-07-09 | End: 2019-07-09 | Stop reason: HOSPADM

## 2019-07-09 RX ORDER — HYDROMORPHONE HYDROCHLORIDE 1 MG/ML
0.5 INJECTION, SOLUTION INTRAMUSCULAR; INTRAVENOUS; SUBCUTANEOUS
Status: DISCONTINUED | OUTPATIENT
Start: 2019-07-09 | End: 2019-07-12 | Stop reason: HOSPADM

## 2019-07-09 RX ORDER — FENTANYL CITRATE 50 UG/ML
50 INJECTION, SOLUTION INTRAMUSCULAR; INTRAVENOUS AS NEEDED
Status: DISCONTINUED | OUTPATIENT
Start: 2019-07-09 | End: 2019-07-09 | Stop reason: HOSPADM

## 2019-07-09 RX ORDER — SODIUM CHLORIDE 0.9 % (FLUSH) 0.9 %
5-40 SYRINGE (ML) INJECTION EVERY 8 HOURS
Status: DISCONTINUED | OUTPATIENT
Start: 2019-07-09 | End: 2019-07-10

## 2019-07-09 RX ORDER — CEFAZOLIN SODIUM 1 G/3ML
INJECTION, POWDER, FOR SOLUTION INTRAMUSCULAR; INTRAVENOUS AS NEEDED
Status: DISCONTINUED | OUTPATIENT
Start: 2019-07-09 | End: 2019-07-09 | Stop reason: HOSPADM

## 2019-07-09 RX ORDER — ONDANSETRON 2 MG/ML
4 INJECTION INTRAMUSCULAR; INTRAVENOUS AS NEEDED
Status: DISCONTINUED | OUTPATIENT
Start: 2019-07-09 | End: 2019-07-09 | Stop reason: HOSPADM

## 2019-07-09 RX ADMIN — FENTANYL CITRATE 50 MCG: 50 INJECTION, SOLUTION INTRAMUSCULAR; INTRAVENOUS at 06:31

## 2019-07-09 RX ADMIN — OXYCODONE AND ACETAMINOPHEN 1 TABLET: 5; 325 TABLET ORAL at 23:29

## 2019-07-09 RX ADMIN — DEXAMETHASONE SODIUM PHOSPHATE 4 MG: 4 INJECTION, SOLUTION INTRA-ARTICULAR; INTRALESIONAL; INTRAMUSCULAR; INTRAVENOUS; SOFT TISSUE at 04:13

## 2019-07-09 RX ADMIN — HYDROMORPHONE HYDROCHLORIDE 0.25 MG: 2 INJECTION, SOLUTION INTRAMUSCULAR; INTRAVENOUS; SUBCUTANEOUS at 04:23

## 2019-07-09 RX ADMIN — ONDANSETRON 4 MG: 2 INJECTION INTRAMUSCULAR; INTRAVENOUS at 04:14

## 2019-07-09 RX ADMIN — ROCURONIUM BROMIDE 10 MG: 10 INJECTION, SOLUTION INTRAVENOUS at 03:29

## 2019-07-09 RX ADMIN — DEXAMETHASONE SODIUM PHOSPHATE 4 MG: 4 INJECTION, SOLUTION INTRA-ARTICULAR; INTRALESIONAL; INTRAMUSCULAR; INTRAVENOUS; SOFT TISSUE at 06:23

## 2019-07-09 RX ADMIN — SODIUM CHLORIDE, SODIUM LACTATE, POTASSIUM CHLORIDE, CALCIUM CHLORIDE: 600; 310; 30; 20 INJECTION, SOLUTION INTRAVENOUS at 05:13

## 2019-07-09 RX ADMIN — FENTANYL CITRATE 50 MCG: 50 INJECTION, SOLUTION INTRAMUSCULAR; INTRAVENOUS at 03:29

## 2019-07-09 RX ADMIN — Medication 10 ML: at 22:16

## 2019-07-09 RX ADMIN — HYDROMORPHONE HYDROCHLORIDE 0.25 MG: 2 INJECTION, SOLUTION INTRAMUSCULAR; INTRAVENOUS; SUBCUTANEOUS at 06:05

## 2019-07-09 RX ADMIN — CARVEDILOL 3.12 MG: 3.12 TABLET, FILM COATED ORAL at 17:30

## 2019-07-09 RX ADMIN — DEXMEDETOMIDINE HYDROCHLORIDE 2 MCG: 4 INJECTION, SOLUTION INTRAVENOUS at 05:59

## 2019-07-09 RX ADMIN — PROPOFOL 50 MG: 10 INJECTION, EMULSION INTRAVENOUS at 03:30

## 2019-07-09 RX ADMIN — HEPARIN SODIUM 5000 UNITS: 1000 INJECTION, SOLUTION INTRAVENOUS; SUBCUTANEOUS at 04:15

## 2019-07-09 RX ADMIN — SUCCINYLCHOLINE CHLORIDE 160 MG: 20 INJECTION INTRAMUSCULAR; INTRAVENOUS at 03:30

## 2019-07-09 RX ADMIN — SODIUM CHLORIDE 50 ML/HR: 900 INJECTION, SOLUTION INTRAVENOUS at 07:19

## 2019-07-09 RX ADMIN — ALBUMIN HUMAN 250 ML: 50 SOLUTION INTRAVENOUS at 04:49

## 2019-07-09 RX ADMIN — OXYCODONE AND ACETAMINOPHEN 1 TABLET: 5; 325 TABLET ORAL at 15:53

## 2019-07-09 RX ADMIN — DEXMEDETOMIDINE HYDROCHLORIDE 2 MCG: 4 INJECTION, SOLUTION INTRAVENOUS at 05:50

## 2019-07-09 RX ADMIN — LIDOCAINE HYDROCHLORIDE 60 MG: 20 INJECTION, SOLUTION EPIDURAL; INFILTRATION; INTRACAUDAL; PERINEURAL at 03:29

## 2019-07-09 RX ADMIN — DEXAMETHASONE SODIUM PHOSPHATE 2 MG: 4 INJECTION, SOLUTION INTRA-ARTICULAR; INTRALESIONAL; INTRAMUSCULAR; INTRAVENOUS; SOFT TISSUE at 05:56

## 2019-07-09 RX ADMIN — LIDOCAINE HYDROCHLORIDE 40 MG: 20 INJECTION, SOLUTION EPIDURAL; INFILTRATION; INTRACAUDAL; PERINEURAL at 03:26

## 2019-07-09 RX ADMIN — HEPARIN SODIUM 18 UNITS/KG/HR: 10000 INJECTION, SOLUTION INTRAVENOUS at 07:12

## 2019-07-09 RX ADMIN — ESMOLOL HYDROCHLORIDE 10 MG: 10 INJECTION INTRAVENOUS at 03:34

## 2019-07-09 RX ADMIN — SODIUM CHLORIDE 1.5 ML: 9 INJECTION INTRAMUSCULAR; INTRAVENOUS; SUBCUTANEOUS at 12:40

## 2019-07-09 RX ADMIN — DEXMEDETOMIDINE HYDROCHLORIDE 2 MCG: 4 INJECTION, SOLUTION INTRAVENOUS at 05:54

## 2019-07-09 RX ADMIN — ALBUMIN HUMAN 250 ML: 50 SOLUTION INTRAVENOUS at 05:48

## 2019-07-09 RX ADMIN — HEPARIN SODIUM 2000 UNITS: 1000 INJECTION, SOLUTION INTRAVENOUS; SUBCUTANEOUS at 05:13

## 2019-07-09 RX ADMIN — CEFAZOLIN SODIUM 2 G: 1 INJECTION, POWDER, FOR SOLUTION INTRAMUSCULAR; INTRAVENOUS at 03:49

## 2019-07-09 RX ADMIN — PROPOFOL 50 MG: 10 INJECTION, EMULSION INTRAVENOUS at 03:29

## 2019-07-09 RX ADMIN — SODIUM CHLORIDE, SODIUM LACTATE, POTASSIUM CHLORIDE, CALCIUM CHLORIDE: 600; 310; 30; 20 INJECTION, SOLUTION INTRAVENOUS at 02:50

## 2019-07-09 NOTE — PROGRESS NOTES
Physical Therapy  Order received and chart reviewed, noted patient off the floor for sx today. Will follow up as appropriate. Thank you.      Cora Krishna, PT, DPT

## 2019-07-09 NOTE — OP NOTES
[address] [facility]  OPERATIVE REPORT    Name:  Robe Garcia  MR#:  [id number]  :  1955  ACCOUNT #:  [de-identified]  DATE OF SERVICE:  2019      PREOPERATIVE DIAGNOSIS:  Acute left lower extremity ischemia. POSTOPERATIVE DIAGNOSIS:  Acute left lower extremity ischemia. PROCEDURES PERFORMED:  1. Left aortobifemoral limb thrombectomy. 2.  Left fem-popliteal bypass thrombectomy. 3.  Nonselective left lower extremity angiogram.  4.  Injection of 4 mg of tPA. 5.  Transposition of left profunda femoral artery. 6.  Four compartment left lower extremity fasciotomy. SURGEON:  Lucrecia Barthel, MD    ASSISTANT:  None. ANESTHESIA:  General.    COMPLICATIONS:  None. SPECIMENS REMOVED:  None. IMPLANTS:  None. ESTIMATED BLOOD LOSS:  400 mL. OPERATIVE INDICATION:  The patient is a 43-year-old gentleman with dementia but otherwise active who presented with acute left lower extremity ischemia. He is known to have cardiomyopathy with a diminished ejection fraction. He underwent a right-sided aortobifem limb thrombectomy over 1 year ago and was placed on Eliquis. Apparently, this was discontinued, and he presented to an outside hospital at 6 p.m. [facility] with left lower extremity ischemia. Of note, based on his CT scan from the prior admission, he was known to have an occlusion of his profunda femoral artery and his only outflow of the left aortobifem limb was a fem above-knee prosthetic bypass. Upon presentation, he had no motor or no sensory function of the leg. Therefore, I took him emergently for a left lower extremity thrombectomy. Prior to the procedure, the nature of the procedure as well as the risks and benefits were explained to the patient and his daughter and they elected to proceed. PERATIVE DETAIL:  The patient was identified in the preoperative holding area. His left leg was marked. Consents were signed.   The patient was brought back to operating room where general anesthesia was induced. The bilateral groins and left leg were prepped and draped in usual standard fashion. A time-out was then performed. I began the procedure by repeating the vertical incision in the groin. This was then dissected at least 2 times. There was a significant amount of scar and it was a very tedious dissection. Ultimately, I was able to dissect out the aortobifem limb, feet, and the groin, and this was looped. I was also able to dissect out the femoral popliteal graft from the groin and this was also looped. I then knowing I may need to revascularize the profunda, I then dissected out the profunda lateral to the common femoral artery. Once this was done under adequate control, I then administered 5000 units of IV heparin. I opened up the dumas of the fem-pop graft which was placed on top of the dumas of the aortobifem limb. Upon opening this, I removed a fair amount of fresh thrombus and laminated thrombus within the dilated portions of the graft. I began by opening up a #5 Alyssa and opening up the arterial inflow. With one pass, I had significant inflow and retrieved a large amount of fresh thrombus. The second pass was negative. Therefore, the aortobifem limb was clamped and heparinized. In similar fashion, I passed #4 and #5 Alyssa into the fem-popliteal artery graft. Thrombectomy achieved a large amount of thrombus and I did have backbleeding. Once I was satisfied with this, I heparinized the fem-pop bypass. Then, I used a 5-0 Prolene to close the dumas of the fem-pop bypass. With that complete, I then felt a good pulse in the dumas of the anastomosis. I then brought on a micropuncture kit and shot the nonselective left lower extremity angiogram.  This showed a widely patent fem-pop graft all the way down to the anastomosis. The behind-knee popliteal artery was patent without a significant stenosis.   There was a fair amount of thrombus sitting in the below-knee popliteal area with no tibial outflow. Therefore, I switched to a 7-Kosovan sheath, I then used an over-the-wire Alyssa to inflate over a Glidewire into the peroneal artery. I then used a #5 Alyssa to perform thrombectomy over the wire. In doing this, I achieved a large amount of thrombus from the popliteal artery. I then shot another angiogram which showed no clot in the popliteal artery inflow into the peroneal artery. There was still a fair amount of spasm and or clot in the tibials. Satisfied with this, I then removed the catheters and wires and injected 4 mg of tPA down the fem-pop bypass. I then closed the graft arteriotomy site with a 5-0 Prolene. I was then worried about the long-term durability of this bypass given the  outflow. Therefore, for more durable result, I elected to revascularize the profunda. I originally was going to perform a patch but the profunda had a very posterior lay and would require a significant patch with all the prosthetic in his groin. Therefore, I mobilized it all the way to the common femoral artery and transected the profunda. I then used a 5-0 Prolene to oversew the stump of the profunda. With the profunda now mobilized, I was able to swing it more distally on the fem-pop bypass graft. I then performed end-to-side anastomosis from the profunda to the graft using a 6-0 Prolene. This came together nicely. This was essentially a jump bypass from the profunda to the fem-pop bypass. I then tied this down. It did require 1 repair suture. With everything restored, I listened with a Doppler, and there was great flow in the profunda. Satisfied with the results of the procedure, I then copiously irrigated the wounds and closed deep layers with 2-0 Vicryl, then 3-0 Vicryl, and stapled the skin closed. Given his advanced ischemia, I then proceeded with a four compartment fasciotomy in the left lower extremity.   I did a 10 cm incision on the medial and lateral side of the legs. On the medial side, electrocautery was moved down to the deep compartments. The muscle was reactive. On the anterior and lateral compartments on the lateral side, I used Metzenbaum scissors to extend my fasciotomy all the way  up and down the leg. The muscle was also viable and responding to electrocautery. Sterile dressings were then applied. At the completion of the procedure, all needle, sponge, and instrument counts were correct x2. The patient tolerated the procedure and went to the recovery area in stable condition.         MD MICAH [email or web address]  D:  07/09/2019 6:23  T:  07/09/2019 11:35  JOB #:  [id number] Spine appears normal, range of motion is not limited, no muscle or joint tenderness

## 2019-07-09 NOTE — CONSULTS
Cardiology Consult Note      Patient Name: Disha Parra  : [id number] MRN: [id number]  Date: 2019  Time: 12:17 PM    Admit Diagnosis: Leg pain, left [M79.605]    Primary Cardiologist: Marlon Santa M.D. Consulting Cardiologist: Tami Huffman. Dre Weems for Consult: h/o chf    Requesting MD: Aura Mao MD    HPI:  Disha Parra is a 58 y.o. male admitted on 2019  for Leg pain, left [M79.605]. has a past medical history of A-fib ([address] Anemia, Anxiety, CAD (coronary artery disease) (12/10/2010), Chronic airway obstruction, not elsewhere classified (), Chronic systolic heart failure ([address] (12/10/2010), COPD (chronic obstructive pulmonary disease) ([address] GERD (gastroesophageal reflux disease), ICD (implantable cardioverter-defibrillator) in place, Ischemic cardiomyopathy, PAD (peripheral artery disease) ([address] and Tobacco use disorder. Presented for acute ischemic left foot. Transferred from [facility] to [facility] for iliac graft occlusion. Emergently completed left aortobifemoral limb thrombectomy, left fem-popliteal thrombectomy, LLE Angiogram, transposition of the left profunda with 4cmpt Fasciotomy Left Lower Leg. He is in room 441 recovering. He has moderate dementia, which is baseline at current. He can offer no real usable history. Records reviewed. H/o systolic HF, EF 79%. Usually on Eliquis for his PTFE grafts, this was stopped for some reason. Subjective:  Received sitting up in bed. Happily eating. No c/o CP or SOB. Baseline confusion. Assessment and Plan     1. Systolic HF - chronic   - Compensated   - NYHA class II - presumed as he is not ambulating 2/2 surgery   - EF 20% in 2018 - echo repeating   - Coreg and Lisinopril restarted   - Lower BP, will hold off on Aldactone at this time. 2. PAD   - acute graft thrombosis   - s/p thrombectomies   - per Vasc Surgery   - on Hep gtt  3.  ICM   - EF 20%   - ICD implanted by Dr. Avitia Mean  4. CAD   - h/o CABG   - Coreg, ASA (PTA)    Compensated systolic HF, EF 47%. Echo repeating per Primary team.  Will resume GDT meds and watch volume. Will follow up on echo results. Saw and evaluated pt and agree with above assessment and plan. Overall compensated and stable cardiac wise. Echo was unchanged. Will be available prn. Cardiac meds as noted above. Rodríguez Miller MD      Patient Active Problem List   Diagnosis Code    Chronic airway obstruction, not elsewhere classified J44.9    CAD (coronary artery disease) I25.10    PAD (peripheral artery disease) ([facility] I73.9    Ischemic cardiomyopathy I25.5    Chronic systolic heart failure (HCC) I50.22    Tobacco use disorder F17.200    Paroxysmal ventricular tachycardia ([facility]) I47.2    AICD at end of battery life Z45.02    Gangrene of foot ([facility] [address] I96    Ischemia of right lower extremity I99.8    Leg pain, left M79.605     ICD: [employer] V-193 2/8/2006, Dr. Reese Cuellar.  ms or 160 bpm 11/16/2013 to VF zone and 3 ATP then 25 J shock (this looked like AF-metoprolol increased)  4v CABG 2005 ([email or web address]) - VG-LAD, VG-ramus, seq VG-PDA/PLV, Severe LV dysfunction  June 2010, akinetic/dyskinetic apex,- EF 20% by   Right iliac stent July 2010 Iris Oppenheim), left iliac occluded- Fem-fem bypass Iris Oppenheim)  - complicated by infection s/p removal Sept 2010  Echo 12/11/2013 Ejection fraction was estimated to be 20 % in the range of 15 % to 25 %  ICD with single lead RIATA silicone lead on recall  He has had SVT and ATP and 4 shocks 25 J in March 2014, more shocks for rapid AF in April 2014 He has been in [address] [place] [address] due to stomach lesion so far this month  July 2014 shocks for SVT again, EOL ICD. RIATA silicone lead (Dr David Claros discussed with him and family, they want lead extraction.  I think he also may need AV node ablation and BIV ICD upgrade, PAT with shocks several times before, not candidate for AT left ablation)  3/10/15 :  ICD exchange:  [address] Buster Mercado [id number], serial # [id number], model #CD-1357-40C     Review of Systems:  Limited 2/2 dementia. Reports no CP or SOB    Previous treatment/evaluation includes Coronary Artery Bypass Graft, persantine thallium and cardiac catheterization . Cardiac risk factors: smoking/ tobacco exposure, family history, dyslipidemia, male gender, hypertension. Past Medical History:   Diagnosis Date    [facility]fib ([facility]     Anemia     Anxiety     CAD (coronary artery disease) 12/10/2010    Chronic airway obstruction, not elsewhere classified 12/10/2010    Chronic systolic heart failure ([facility] 12/10/2010    COPD (chronic obstructive pulmonary disease) ([facility]     GERD (gastroesophageal reflux disease)     ICD (implantable cardioverter-defibrillator) in place     Ischemic cardiomyopathy     PAD (peripheral artery disease) ([facility]     Tobacco use disorder      History reviewed. No pertinent surgical history. Current Facility-Administered Medications   Medication Dose Route Frequency    heparin 25,000 units in D5W 250 ml infusion  18-36 Units/kg/hr IntraVENous TITRATE    0.9% sodium chloride infusion  50 mL/hr IntraVENous CONTINUOUS    sodium chloride (NS) flush 5-40 mL  5-40 mL IntraVENous Q8H    sodium chloride (NS) flush 5-40 mL  5-40 mL IntraVENous PRN    acetaminophen (TYLENOL) tablet 650 mg  650 mg Oral Q4H PRN    oxyCODONE-acetaminophen (PERCOCET) 5-325 mg per tablet 1 Tab  1 Tab Oral Q4H PRN    HYDROmorphone (PF) (DILAUDID) injection 0.5 mg  0.5 mg IntraVENous Q4H PRN    carvedilol (COREG) tablet 3.125 mg  3.125 mg Oral BID WITH MEALS    perflutren lipid microspheres (DEFINITY) in NS bolus IV  1.5 mL IntraVENous RAD ONCE    sodium chloride (NS) flush 30 mL  30 mL IntraVENous RAD ONCE       Allergies   Allergen Reactions    Codeine Nausea and Vomiting and Other (comments)    Morphine Itching and Other (comments)     \"crazy\" per family      History reviewed.  No pertinent family history. Social History     Socioeconomic History    Marital status:      Spouse name: Not on file    Number of children: Not on file    Years of education: Not on file    Highest education level: Not on file   Tobacco Use    Smoking status: Current Every Day Smoker     Packs/day: 1.00     Years: 44.00     Pack years: 44.00     Types: Cigarettes    Smokeless tobacco: Never Used   Substance and Sexual Activity    Alcohol use: Yes     Comment: rarely    Drug use: No       Objective:    Physical Exam    Vitals:   Vitals:    07/09/19 0900 07/09/19 0911 07/09/19 0932 07/09/19 1118   BP: 131/76  131/59 128/77   Pulse: 92  99 93   Resp: 14  16 18   Temp:  98.2 °F (36.8 °C) 97.5 °F (36.4 °C) 98.5 °F (36.9 °C)   SpO2: 100%   99%   Weight:           General:    Alert, cooperative, no distress, appears stated age. Baseline dementia. Neck:   Supple, no carotid bruit and no JVD. Back:     Symmetric, normal curvature. Lungs:     Clear to auscultation bilaterally. Heart[de-identified]    Regular rate and rhythm, S1, S2 normal, no murmur, click, rub or gallop. Abdomen:     Soft, non-tender. Bowel sounds normal.    Extremities:   Extremities normal, atraumatic, no cyanosis or edema on right. Left with ace wraps. Vascular:   Pulses - 2+ radials   Skin:   Skin color normal. No rashes or lesions   Neurologic:   CN II-XII grossly intact. Telemetry: normal sinus rhythm    ECG:   EKG Results     None            Data Review:     Radiology:   XR Results (most recent):  Results from Hospital Encounter encounter on 04/07/18   XR ABD (KUB)    Narrative INTERPRETATION PROVIDED FOR COMPLIANCE ONLY AT NO CHARGE    Contrast is in the distal aorta and common iliac arteries. Improved flow in the  proximal right common iliac artery on the delayed images. See operative report  for details. No results for input(s): CPK, TROIQ in the last 72 hours.     No lab exists for component: CKQMB, CPKMB, BMPP  Recent Labs 07/09/19  0653 07/09/19 0218    144   K 4.2 3.6   * 116*   CO2 22 21   BUN 7 8   CREA 0.72 0.55*   * 93   CA 7.8* 6.6*     Recent Labs     07/09/19  0653 07/09/19 0218   WBC 10.2 10.3   HGB 14.4 13.3   HCT 44.9 41.8   PLT 86* 82*     Recent Labs     07/09/19 0218   SGOT 36   AP 92     No results for input(s): CHOL, LDLC in the last 72 hours. No lab exists for component: TGL, HDLC,  HBA1C  No results for input(s): CRP, TSH, TSHEXT in the last 72 hours. No lab exists for component: ESR    Filiberto De Oliveira. Linda Goldberg M.D.           [facility] [address] [place] [address] [address] [address]     Joao Pugh     [phone]    Heriberto Valentino MD

## 2019-07-09 NOTE — ED PROVIDER NOTES
HPI     58year old male with history of CAD, COPD, GERD, peripheral artery disease, presents to us from [facility] with acutely ischemic left foot. Per report has an iliac graft occlusion. [facility], Dr Eileen Carvalho, saw him there and is transferring him here per patients vascular surgeon, dr. Desean Toro. Patient has dementia and is unable to provide any history. Per EMS report, foot turned pale 1-2 hours before presentation to outside ed. Past Medical History:   Diagnosis Date    CAD (coronary artery disease) 12/10/2010    Chronic airway obstruction, not elsewhere classified 12/10/2010    Chronic systolic heart failure ([address] 12/10/2010    [facility]) [facility]     GERD (gastroesophageal reflux disease)     ICD (implantable cardioverter-defibrillator) in place     Ischemic cardiomyopathy     PAD (peripheral artery disease) ([address]     Tobacco use disorder        No past surgical history on file. No family history on file.     Social History     Socioeconomic History    Marital status:      Spouse name: Not on file    Number of children: Not on file    Years of education: Not on file    Highest education level: Not on file   Occupational History    Not on file   Social Needs    Financial resource strain: Not on file    Food insecurity:     Worry: Not on file     Inability: Not on file    Transportation needs:     Medical: Not on file     Non-medical: Not on file   Tobacco Use    Smoking status: Current Every Day Smoker     Packs/day: 1.00     Years: 44.00     Pack years: 44.00     Types: Cigarettes    Smokeless tobacco: Never Used   Substance and Sexual Activity    Alcohol use: Yes     Comment: rarely    Drug use: No    Sexual activity: Not on file   Lifestyle    Physical activity:     Days per week: Not on file     Minutes per session: Not on file    Stress: Not on file   Relationships    Social connections:     Talks on phone: Not on file     Gets together: Not on file     Attends Sabianism service: Not on file     Active member of club or organization: Not on file     Attends meetings of clubs or organizations: Not on file     Relationship status: Not on file    Intimate partner violence:     Fear of current or ex partner: Not on file     Emotionally abused: Not on file     Physically abused: Not on file     Forced sexual activity: Not on file   Other Topics Concern    Not on file   Social History Narrative    Not on file         ALLERGIES: Codeine and Morphine    Review of Systems   Unable to perform ROS: Dementia       There were no vitals filed for this visit. Physical Exam   Constitutional: He appears well-developed and well-nourished. No distress. HENT:   Head: Normocephalic and atraumatic. Mouth/Throat: No oropharyngeal exudate. Eyes: Pupils are equal, round, and reactive to light. Right eye exhibits no discharge. Left eye exhibits no discharge. No scleral icterus. Neck: Normal range of motion. Neck supple. No JVD present. Cardiovascular: Normal rate, regular rhythm and normal heart sounds. No murmur heard. Pulmonary/Chest: Effort normal and breath sounds normal. No stridor. No respiratory distress. He has no wheezes. He has no rales. He exhibits no tenderness. Abdominal: Soft. Bowel sounds are normal. He exhibits no distension and no mass. There is no tenderness. There is no rebound and no guarding. Musculoskeletal: Normal range of motion. Left leg is cooler then right  Left foot is pale and with no palpable pulses   Neurological: He is alert. Skin: Skin is warm and dry. Capillary refill takes less than 2 seconds. No rash noted. Psychiatric:   Appears anxious        MDM       Procedures    Consult to Dr. Prabhakar Lopez placed  Will restart heparin gtt. Dr. Prabhakar Lopez in ED evaluating patient and will place orders.

## 2019-07-09 NOTE — H&P
Vascular Surgery History and Physical    Subjective:      Srikanth Regalado is a 58 y.o. male who presents acute LLE ischemia. He has dementia but is otherwise active. He reports severe pain in his left leg that has actually gotten better. He is unable to move his foot or feel his foot. This started this afternoon per the patients daughter. He has known CHF and was taken off his eliquis for some reason. He had a right graft thrombectomy over one year ago. Past Medical History:   Diagnosis Date    CAD (coronary artery disease) 12/10/2010    Chronic airway obstruction, not elsewhere classified 12/10/2010    Chronic systolic heart failure ([address] 12/10/2010    [facility]) [facility]     GERD (gastroesophageal reflux disease)     ICD (implantable cardioverter-defibrillator) in place     Ischemic cardiomyopathy     PAD (peripheral artery disease) ([address]     Tobacco use disorder      History reviewed. No pertinent surgical history. History reviewed. No pertinent family history. Social History     Tobacco Use    Smoking status: Current Every Day Smoker     Packs/day: 1.00     Years: 44.00     Pack years: 44.00     Types: Cigarettes    Smokeless tobacco: Never Used   Substance Use Topics    Alcohol use: Yes     Comment: rarely      Prior to Admission medications    Medication Sig Start Date End Date Taking? Authorizing Provider   apixaban (ELIQUIS) 5 mg tablet Take 1 Tab by mouth two (2) times a day. 4/12/18   Vinicius Yadav MD   aspirin 81 mg chewable tablet Take 1 Tab by mouth daily. 4/12/18   Vinicius Yadav MD   carvedilol (COREG) 3.125 mg tablet Take 1 Tab by mouth two (2) times daily (with meals). 4/12/18   Vinicius Yadav MD   gabapentin (NEURONTIN) 100 mg capsule Take 1 Cap by mouth three (3) times daily. 4/12/18   Vinicius Yadav MD   HYDROcodone-acetaminophen [facility]) 7.5-325 mg per tablet Take 1 Tab by mouth every four (4) hours as needed. Max Daily Amount: 6 Tabs. 18   Joel Harry MD   therapeutic multivitamin [facility]) tablet Take 1 Tab by mouth daily. Provider, Historical   lisinopril (PRINIVIL, ZESTRIL) 10 mg tablet Take 10 mg by mouth daily. Provider, Historical      Allergies   Allergen Reactions    Codeine Nausea and Vomiting and Other (comments)    Morphine Itching and Other (comments)     \"crazy\" per family       Review of Systems:  A comprehensive review of systems was negative except for that written in the History of Present Illness. Objective:        Patient Vitals for the past 8 hrs:   BP Temp Pulse Resp SpO2 Weight   19 0206 (!) 167/96 98.1 °F (36.7 °C) (!) 103 13 94 % 78.5 kg (173 lb 1 oz)       Temp (24hrs), Av.1 °F (36.7 °C), Min:98.1 °F (36.7 °C), Max:98.1 °F (36.7 °C)      Physical Exam:  GENERAL: alert, cooperative, no distress, appears stated age, THROAT & NECK: normal and no erythema or exudates noted. , LUNG: clear to auscultation bilaterally, HEART: regular rate and rhythm, S1, S2 normal, no murmur, click, rub or gallop, ABDOMEN: soft, non-tender. Bowel sounds normal. No masses,  no organomegaly, EXTREMITIES:  extremities normal, atraumatic, no cyanosis or edema, NEUROLOGIC: negative Left leg cool with no motor or sensory function    Assessment:     57 y/o WM with thrombosed left aortobifemoral limb and acute LLE ischemia. Plan:     Acutely threatened ischemia with diminished motor/sensory function. To the OR for thrombectomy and fasciotomies. High risk of limb loss. Explained to patient and daughter.

## 2019-07-09 NOTE — PERIOP NOTES
TRANSFER - OUT REPORT:    Verbal report given to Howard Burt RN(name) on Sheldon Rasheedtocks  being transferred to (unit) for routine post - op       Report consisted of patients Situation, Background, Assessment and   Recommendations(SBAR). Time Pre op antibiotic given: 5424  Anesthesia Stop time: 0901  [place] Present on Transfer to floor:yes  Order for [place]cl[place] on Chart:yes  Discharge Prescriptions with Chart:no      Information from the following report(s) SBAR, Kardex, ED Summary, OR Summary, Procedure Summary, Intake/Output, MAR, Accordion and Cardiac Rhythm NSR was reviewed with the receiving nurse. Opportunity for questions and clarification was provided. Is the patient on 02? YES       L/Min 2    Is the patient on a monitor? YES    Is the nurse transporting with the patient? YES    Surgical Waiting Area notified of patient's transfer from PACU?  YES      The following personal items collected during your admission accompanied patient upon transfer:   Dental Appliance:    Vision:    Hearing Aid:    Jewelry:    Clothing: Clothing: With patient  Other Valuables:    Valuables sent to safe:

## 2019-07-09 NOTE — ED NOTES
Dr. Asiya Barrett at bedside to evaluate patient. Dr. Manda Shepard is for OR tonight. Patient's daughter called for consent. Dr. Asiya Barrett and RN confirm telephone consent.    Patient's daughter, Nanci Sacks, number is [phone]

## 2019-07-09 NOTE — PROGRESS NOTES
[facility] with Dr Manjula Moses, unable to palpate pedal pulse of LLE, post tibial pulse audible with doppler. Extremity is warm and patient able to wiggle toes. Per MD keep Heparin gtt infusing, no further orders received. Will monitor.

## 2019-07-09 NOTE — PROGRESS NOTES
[facility] with Dr Sheila Moya, unable to palpate pedal pulse and unable to of LLE, post tibial pulse audible with doppler. Extremity is warm and patient able to wiggle toes. Per MD keep Heparin gtt infusing, no further orders received. Will monitor.

## 2019-07-09 NOTE — ED NOTES
Pharmacy verifying patient's heparin gtt. Per Dr. Bonny Hernandez, patient to go down to OR now and have the PACU start the dripSusan Goldman with PACU notified. Patient transferred to PACU with OR tech. VSS and in no acute distress upon transfer.

## 2019-07-09 NOTE — PERIOP NOTES
0252 Pt arrived in PACU for pre op, Dr Konstantin Leon here noting not necessary to start Heparin gtt or repeat ptt prior to surgery, pt needs to get to OR. Pt is alert, confused oriented to first name, history of dementia. BP elevated, CRNA made aware.

## 2019-07-09 NOTE — ED TRIAGE NOTES
Patient arrives from [facility] with CC of a DVT in the L leg. L foot in pale and pulseless. Pt arrives A&Ox1 with NS infusing at 100ml/hr and a heparin gtt infusing in the L arm at 9.7ml/hr. Hx Dementia.

## 2019-07-09 NOTE — PROGRESS NOTES
Occupational Therapy  Order received and chart reviewed, noted patient off the floor for sx. Will follow up as able/appropriate. Thank you.      Fly Murcia OTR/L

## 2019-07-09 NOTE — ROUTINE PROCESS
0730 Bedside and Verbal shift change report given to ANABELL Trotter RN (oncoming nurse) by Celia Chaves RN (offgoing nurse). Report included the following information SBAR, Kardex, ED Summary, OR Summary, Procedure Summary, Intake/Output, MAR, Accordion, Recent Results and Cardiac Rhythm NSR.

## 2019-07-09 NOTE — ED NOTES
TRANSFER - OUT REPORT:    Verbal report given to Shavon (name) on Nacho Benites  being transferred to [facility] for routine progression of care       Report consisted of patients Situation, Background, Assessment and   Recommendations(SBAR). Information from the following report(s) SBAR, ED Summary, Intake/Output, MAR and Recent Results was reviewed with the receiving nurse. Lines:   Peripheral IV 07/09/19 Left Antecubital (Active)   Site Assessment Clean, dry, & intact 7/9/2019  2:28 AM   Phlebitis Assessment 0 7/9/2019  2:28 AM   Infiltration Assessment 0 7/9/2019  2:28 AM   Dressing Status Clean, dry, & intact 7/9/2019  2:28 AM       Peripheral IV 07/09/19 Right Forearm (Active)   Site Assessment Clean, dry, & intact 7/9/2019  2:29 AM   Phlebitis Assessment 0 7/9/2019  2:29 AM   Infiltration Assessment 0 7/9/2019  2:29 AM   Dressing Status Clean, dry, & intact 7/9/2019  2:29 AM        Opportunity for questions and clarification was provided. Patient transported with  OR tech.

## 2019-07-09 NOTE — ANESTHESIA POSTPROCEDURE EVALUATION
Procedure(s):  THROMBECTOMY/EMBOLECTOMY LOWER EXTREMITY. general    Anesthesia Post Evaluation        Patient location during evaluation: PACU  Patient participation: complete - patient participated  Level of consciousness: awake and alert  Pain management: adequate  Airway patency: patent  Anesthetic complications: no  Cardiovascular status: acceptable  Respiratory status: acceptable  Hydration status: acceptable  Comments: I have seen and evaluated the patient and is ready for discharge. Mera Hernández MD    Post anesthesia nausea and vomiting:  none      No vitals data found for the desired time range.

## 2019-07-09 NOTE — BRIEF OP NOTE
BRIEF OPERATIVE NOTE    Date of Procedure: 7/9/2019   Preoperative Diagnosis: Left leg cold  Postoperative Diagnosis: Left leg cold    Procedure(s):  Left aortobifemoral limb thrombectomy, left fem-popliteal thrombectomy, LLE Angiogram, transposition of the left profunda with 4cmpt Fasciotomy Left Lower Leg. Surgeon(s) and Role:     * Romel Joseph MD - Primary       Surgical Staff:  Circ-1: Tony Jauregui RN  Scrub Tech-1: Hermelinda Love  Surg Asst-1: Zacekry Kerr  Event Time In Time Out   Incision Start 0354    Incision Close       Anesthesia: General   Estimated Blood Loss: 400  Specimens: * No specimens in log *   Findings: Only outflow was the prosthetic fem-pop with a chronically occluded profunda origin. Thrombectomy with good inflow, outflow very diseased with a peroneal runoff. Therefore, transposed the profunda to the fem-pop bypass.    Complications: None  Implants: * No implants in log *

## 2019-07-09 NOTE — ANESTHESIA PREPROCEDURE EVALUATION
Anesthetic History   No history of anesthetic complications            Review of Systems / Medical History  Patient summary reviewed, nursing notes reviewed and pertinent labs reviewed    Pulmonary    COPD: mild               Neuro/Psych         Dementia     Cardiovascular          CHF: NYHA Classification II  Dysrhythmias : atrial fibrillation  CAD      Comments: EF 20%   GI/Hepatic/Renal     GERD           Endo/Other  Within defined limits           Other Findings              Physical Exam    Airway  Mallampati: II  TM Distance: > 6 cm  Neck ROM: normal range of motion   Mouth opening: Normal     Cardiovascular  Regular rate and rhythm,  S1 and S2 normal,  no murmur, click, rub, or gallop             Dental  No notable dental hx       Pulmonary  Breath sounds clear to auscultation               Abdominal  GI exam deferred       Other Findings            Anesthetic Plan    ASA: 4  Anesthesia type: general          Induction: Intravenous  Anesthetic plan and risks discussed with: Patient

## 2019-07-10 LAB
ANION GAP SERPL CALC-SCNC: 6 MMOL/L (ref 5–15)
APTT PPP: 116.7 SEC (ref 22.1–32)
APTT PPP: 127.8 SEC (ref 22.1–32)
APTT PPP: 31.3 SEC (ref 22.1–32)
APTT PPP: 37.5 SEC (ref 22.1–32)
APTT PPP: >130 SEC (ref 22.1–32)
BUN SERPL-MCNC: 13 MG/DL (ref 6–20)
BUN/CREAT SERPL: 20 (ref 12–20)
CALCIUM SERPL-MCNC: 7.7 MG/DL (ref 8.5–10.1)
CHLORIDE SERPL-SCNC: 108 MMOL/L (ref 97–108)
CO2 SERPL-SCNC: 27 MMOL/L (ref 21–32)
CREAT SERPL-MCNC: 0.65 MG/DL (ref 0.7–1.3)
ERYTHROCYTE [DISTWIDTH] IN BLOOD BY AUTOMATED COUNT: 13.7 % (ref 11.5–14.5)
GLUCOSE SERPL-MCNC: 118 MG/DL (ref 65–100)
HCT VFR BLD AUTO: 38.2 % (ref 36.6–50.3)
HGB BLD-MCNC: 12.1 G/DL (ref 12.1–17)
MCH RBC QN AUTO: 31.4 PG (ref 26–34)
MCHC RBC AUTO-ENTMCNC: 31.7 G/DL (ref 30–36.5)
MCV RBC AUTO: 99.2 FL (ref 80–99)
NRBC # BLD: 0 K/UL (ref 0–0.01)
NRBC BLD-RTO: 0 PER 100 WBC
PLATELET # BLD AUTO: 86 K/UL (ref 150–400)
PMV BLD AUTO: 11.6 FL (ref 8.9–12.9)
POTASSIUM SERPL-SCNC: 3.7 MMOL/L (ref 3.5–5.1)
RBC # BLD AUTO: 3.85 M/UL (ref 4.1–5.7)
SODIUM SERPL-SCNC: 141 MMOL/L (ref 136–145)
THERAPEUTIC RANGE,PTTT: ABNORMAL SECS (ref 58–77)
THERAPEUTIC RANGE,PTTT: NORMAL SECS (ref 58–77)
WBC # BLD AUTO: 13.2 K/UL (ref 4.1–11.1)

## 2019-07-10 PROCEDURE — 85027 COMPLETE CBC AUTOMATED: CPT

## 2019-07-10 PROCEDURE — 80048 BASIC METABOLIC PNL TOTAL CA: CPT

## 2019-07-10 PROCEDURE — 77030019952 HC CANSTR VAC ASST KCON -B

## 2019-07-10 PROCEDURE — 85730 THROMBOPLASTIN TIME PARTIAL: CPT

## 2019-07-10 PROCEDURE — 65660000000 HC RM CCU STEPDOWN

## 2019-07-10 PROCEDURE — 74011250636 HC RX REV CODE- 250/636: Performed by: SURGERY

## 2019-07-10 PROCEDURE — 77030018836 HC SOL IRR NACL ICUM -A

## 2019-07-10 PROCEDURE — 36415 COLL VENOUS BLD VENIPUNCTURE: CPT

## 2019-07-10 PROCEDURE — 74011250637 HC RX REV CODE- 250/637: Performed by: SURGERY

## 2019-07-10 PROCEDURE — 77030018717 HC DRSG GRNUFM KCON -B

## 2019-07-10 RX ORDER — ONDANSETRON 2 MG/ML
4 INJECTION INTRAMUSCULAR; INTRAVENOUS
Status: DISCONTINUED | OUTPATIENT
Start: 2019-07-10 | End: 2019-07-12 | Stop reason: HOSPADM

## 2019-07-10 RX ORDER — HEPARIN SODIUM 1000 [USP'U]/ML
80 INJECTION, SOLUTION INTRAVENOUS; SUBCUTANEOUS ONCE
Status: COMPLETED | OUTPATIENT
Start: 2019-07-10 | End: 2019-07-10

## 2019-07-10 RX ADMIN — OXYCODONE AND ACETAMINOPHEN 1 TABLET: 5; 325 TABLET ORAL at 10:16

## 2019-07-10 RX ADMIN — CARVEDILOL 3.12 MG: 3.12 TABLET, FILM COATED ORAL at 17:32

## 2019-07-10 RX ADMIN — SODIUM CHLORIDE 50 ML/HR: 900 INJECTION, SOLUTION INTRAVENOUS at 06:04

## 2019-07-10 RX ADMIN — OXYCODONE AND ACETAMINOPHEN 1 TABLET: 5; 325 TABLET ORAL at 17:32

## 2019-07-10 RX ADMIN — CARVEDILOL 3.12 MG: 3.12 TABLET, FILM COATED ORAL at 08:48

## 2019-07-10 RX ADMIN — HYDROMORPHONE HYDROCHLORIDE 0.5 MG: 1 INJECTION, SOLUTION INTRAMUSCULAR; INTRAVENOUS; SUBCUTANEOUS at 10:55

## 2019-07-10 RX ADMIN — OXYCODONE AND ACETAMINOPHEN 1 TABLET: 5; 325 TABLET ORAL at 21:11

## 2019-07-10 RX ADMIN — OXYCODONE AND ACETAMINOPHEN 1 TABLET: 5; 325 TABLET ORAL at 05:33

## 2019-07-10 RX ADMIN — HEPARIN SODIUM 6280 UNITS: 1000 INJECTION INTRAVENOUS; SUBCUTANEOUS at 13:24

## 2019-07-10 RX ADMIN — HEPARIN SODIUM 12 UNITS/KG/HR: 10000 INJECTION, SOLUTION INTRAVENOUS at 06:29

## 2019-07-10 RX ADMIN — HEPARIN SODIUM 16 UNITS/KG/HR: 10000 INJECTION, SOLUTION INTRAVENOUS at 13:27

## 2019-07-10 NOTE — PROGRESS NOTES
Bedside and Verbal shift change report given to Jessica (oncoming nurse) by Krystle Small (offgoing nurse). Report included the following information SBAR, MAR, Accordion and Cardiac Rhythm NSR.

## 2019-07-10 NOTE — PROGRESS NOTES
Vascular    Doing well, no complaints  Visit Vitals  /58   Pulse 89   Temp 98.4 °F (36.9 °C)   Resp 16   Wt 78.5 kg (173 lb 1 oz)   SpO2 99%   BMI 29.71 kg/m²     Left calf dressed  Left groin dressed  Foot warm and dry with signals, M/S intact    57 y/o WM with acute left lower extremity ischemia, s/p thrombectomy and fasciotomies  - Foot looks good, motor/sensory intact, continue heparin gtt, will transition to low dose eliquis tomorrow  - PT/OT, OOB, d/c ga and fluids  - Wound care consult for fasciotomy wounds, please place wound vac.

## 2019-07-10 NOTE — PROGRESS NOTES
Problem: Falls - Risk of  Goal: *Absence of Falls  Description  Document Mayito Tsai Fall Risk and appropriate interventions in the flowsheet. Outcome: Progressing Towards Goal     Problem: Pressure Injury - Risk of  Goal: *Prevention of pressure injury  Description  Document Del Scale and appropriate interventions in the flowsheet.   Outcome: Progressing Towards Goal     Problem: Surgical Pathway Post-Op Day 1  Goal: Activity/Safety  Outcome: Progressing Towards Goal     Problem: Surgical Pathway Post-Op Day 1  Goal: Nutrition/Diet  Outcome: Progressing Towards Goal     Problem: Surgical Pathway Post-Op Day 1  Goal: Medications  Outcome: Progressing Towards Goal     Problem: Surgical Pathway Post-Op Day 1  Goal: Treatments/Interventions/Procedures  Outcome: Progressing Towards Goal     Problem: Surgical Pathway Post-Op Day 1  Goal: *No signs and symptoms of infection or wound complications  Outcome: Progressing Towards Goal     Problem: Surgical Pathway Post-Op Day 1  Goal: *Optimal pain control at patient's stated goal  Outcome: Progressing Towards Goal     Problem: Surgical Pathway Post-Op Day 1  Goal: *Adequate urinary output (equal to or greater than 30 milliliters/hour)  Outcome: Progressing Towards Goal     Problem: Surgical Pathway Post-Op Day 1  Goal: *Hemodynamically stable  Outcome: Progressing Towards Goal     Problem: Surgical Pathway Post-Op Day 1  Goal: *Demonstrates progressive activity  Outcome: Progressing Towards Goal

## 2019-07-10 NOTE — WOUND CARE
WOCN Note:  
 
New consult to initiate NPWT. Chart shows: 
Admitted for left fem-pop thrombectomy on 7/10 with fasciotomy to left lower leg. History of dementia. Assessment:  
Communicative, continent, and mobile - independently turned in bed. Bed: total care Pre-medicated for pain by RN. Bilateral heel skin intact and without erythema. Heels offloaded with pillows. 1. POA, gluteal cleft linear split = 1.5 x 0.2 x 0.1 cm 100% moist red base. Covered with Roseau. POA, sacrum and buttocks with POA blanching redness/ruddiness. 2. Left lower leg fasciotomy sites: 
Medial = 12 x 3 x 1.2 cm Lateral = 10.5 x 2.3 x 0.2 cm Moist, red, freshly-debrided bases with exposed muscle. No bleeding or vessels noted. There is scant serosanguinous drainage. No redness noted to periwound. No edema noted to lower leg. Cleaned with saline, 50 mmHg continuous suction achieved using 3 pieces of Mepitel One as contact layer to cover both bases entirely, & 3 pieces of black sponge used: one to each wound bed and one to bridge the 2 wounds. Ochoa Whatley, wound care, assisted with dressing change. Recommendations:   
Maintain VAC as ordered @ 50 mmHg continuous suction with twice weekly dressing changes. Minimize layers of linen/pads under patient to optimize support surface. Turn/reposition approximately every 2 hours and offload heels. Aloe Vesta cream to buttocks and sacrum daily and as needed Discussed above plan with RN. Transition of Care: Plan to follow as needed while admitted to hospital with [facility] dressing change on Friday. [employer] paperwork filled out and given to . Doroteo Lamar, CALLUMN, RN, [employer] Certified Wound, Ostomy, Continence Nurse 
office [phone] 
pager [phone] or call  to page
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
## 2019-07-10 NOTE — PROGRESS NOTES
PT Note:    Chart reviewed and iniated PT evaluation. Completed few social history questions and patient actively vomiting up dark brownish-red substance. RN aware. Patient requesting PT abort evaluation at this time. Will continue to follow.

## 2019-07-10 NOTE — PROGRESS NOTES
Critical Lab PTT  >>130 [place]   Notified MD heparin held 0230  Will redraw PTT per protocol

## 2019-07-10 NOTE — PROGRESS NOTES
Mcclure removed this morning. Pt has not voided and denies needing to use urinal at this time. Bladder scanned pt, 288 ml noted. Will continue to monitor for any retention.

## 2019-07-10 NOTE — PROGRESS NOTES
Occupational Therapy    Acknowledge OT orders and completed chart review. Nursing cleared for therapy. Patient received in bed. Denies pain and agreeable to therapy. Reports living at home with family/friends. No WILVER, one level home. During interview for PLOF, patient with episodes of dark red emesis. Discussed with nursing. Aborted session. Will continue to follow for OT services.      Thank you,    Crystal Wilson, OT

## 2019-07-11 LAB
APTT PPP: 30.2 SEC (ref 22.1–32)
APTT PPP: 36.7 SEC (ref 22.1–32)
THERAPEUTIC RANGE,PTTT: ABNORMAL SECS (ref 58–77)
THERAPEUTIC RANGE,PTTT: NORMAL SECS (ref 58–77)

## 2019-07-11 PROCEDURE — 97116 GAIT TRAINING THERAPY: CPT

## 2019-07-11 PROCEDURE — 74011250636 HC RX REV CODE- 250/636

## 2019-07-11 PROCEDURE — 97161 PT EVAL LOW COMPLEX 20 MIN: CPT

## 2019-07-11 PROCEDURE — 97166 OT EVAL MOD COMPLEX 45 MIN: CPT

## 2019-07-11 PROCEDURE — 74011250636 HC RX REV CODE- 250/636: Performed by: SURGERY

## 2019-07-11 PROCEDURE — 97165 OT EVAL LOW COMPLEX 30 MIN: CPT

## 2019-07-11 PROCEDURE — 85730 THROMBOPLASTIN TIME PARTIAL: CPT

## 2019-07-11 PROCEDURE — 36415 COLL VENOUS BLD VENIPUNCTURE: CPT

## 2019-07-11 PROCEDURE — 74011250637 HC RX REV CODE- 250/637: Performed by: SURGERY

## 2019-07-11 PROCEDURE — 65660000000 HC RM CCU STEPDOWN

## 2019-07-11 RX ORDER — GUAIFENESIN 100 MG/5ML
81 LIQUID (ML) ORAL DAILY
Status: DISCONTINUED | OUTPATIENT
Start: 2019-07-11 | End: 2019-07-12 | Stop reason: HOSPADM

## 2019-07-11 RX ORDER — GABAPENTIN 100 MG/1
100 CAPSULE ORAL 3 TIMES DAILY
Status: DISCONTINUED | OUTPATIENT
Start: 2019-07-11 | End: 2019-07-12 | Stop reason: HOSPADM

## 2019-07-11 RX ORDER — LISINOPRIL 10 MG/1
10 TABLET ORAL DAILY
Status: DISCONTINUED | OUTPATIENT
Start: 2019-07-11 | End: 2019-07-12 | Stop reason: HOSPADM

## 2019-07-11 RX ADMIN — SODIUM CHLORIDE 1000 ML: 900 INJECTION, SOLUTION INTRAVENOUS at 18:32

## 2019-07-11 RX ADMIN — LISINOPRIL 10 MG: 10 TABLET ORAL at 08:44

## 2019-07-11 RX ADMIN — HEPARIN SODIUM 13 UNITS/KG/HR: 10000 INJECTION, SOLUTION INTRAVENOUS at 01:36

## 2019-07-11 RX ADMIN — GABAPENTIN 100 MG: 100 CAPSULE ORAL at 16:32

## 2019-07-11 RX ADMIN — CARVEDILOL 3.12 MG: 3.12 TABLET, FILM COATED ORAL at 08:44

## 2019-07-11 RX ADMIN — GABAPENTIN 100 MG: 100 CAPSULE ORAL at 22:55

## 2019-07-11 RX ADMIN — OXYCODONE AND ACETAMINOPHEN 1 TABLET: 5; 325 TABLET ORAL at 05:44

## 2019-07-11 RX ADMIN — APIXABAN 2.5 MG: 2.5 TABLET, FILM COATED ORAL at 08:44

## 2019-07-11 RX ADMIN — OXYCODONE AND ACETAMINOPHEN 1 TABLET: 5; 325 TABLET ORAL at 20:29

## 2019-07-11 RX ADMIN — GABAPENTIN 100 MG: 100 CAPSULE ORAL at 08:44

## 2019-07-11 RX ADMIN — APIXABAN 2.5 MG: 2.5 TABLET, FILM COATED ORAL at 18:03

## 2019-07-11 RX ADMIN — OXYCODONE AND ACETAMINOPHEN 1 TABLET: 5; 325 TABLET ORAL at 11:06

## 2019-07-11 RX ADMIN — ASPIRIN 81 MG 81 MG: 81 TABLET ORAL at 08:44

## 2019-07-11 NOTE — PROGRESS NOTES
Problem: Mobility Impaired (Adult and Pediatric)  Goal: *Acute Goals and Plan of Care (Insert Text)  Description    FUNCTIONAL STATUS PRIOR TO ADMISSION: Patient was independent and active without use of DME (per his report)    HOME SUPPORT: The patient lived with his daughter. He was a limited historian and reports assist from his daughter but could not quantify    Physical Therapy Goals  Initiated 7/11/2019  1. Patient will move from supine to sit and sit to supine  in bed with supervision/set-up within 7 day(s). 2.  Patient will transfer from bed to chair and chair to bed with modified independence using the least restrictive device within 7 day(s). 3.  Patient will perform sit to stand with modified independence within 7 day(s). 4.  Patient will ambulate with modified independence for 100 feet with the least restrictive device within 7 day(s). 5.  Patient will ascend/descend 4 stairs with 1 handrail(s) with modified independence within 7 day(s). Outcome: Progressing Towards Goal   PHYSICAL THERAPY EVALUATION  Patient: Tanya Rojas (29 y.o. male)  Date: 7/11/2019  Primary Diagnosis: Leg pain, left [M79.605]  Procedure(s) (LRB):  Left Leg Thrombectomy, Angiogram, Fasciotomy Left Lower Leg. (Left) 2 Days Post-Op   Precautions:   Fall      ASSESSMENT  Based on the objective data described below, the patient presents with LLE pain impairing ROM, strength, balance, and independence with functional mobility following a left LE thrombectomy. He appears to have an undocumented hx of CVA and has a hx of dementia with impaired recall and aphasic deficits. He reports living with his daughter who works during the day and that he is typically independent for mobility within his apartment. Gait was severely antalgic with poor left LE advancement, increased trunk sway, and difficulty with RW management over 25' during evalution. He required moderate assist overall, especially with turning.  He also has a new wound VAC to contend with raising his risk of falls with already impaired functional mobility. Recommend discharge to a rehab setting when medically cleared. Current Level of Function Impacting Discharge (mobility/balance): moderate assist for RW management with gait      Other factors to consider for discharge: alone during day per patient     Patient will benefit from skilled therapy intervention to address the above noted impairments. PLAN :  Recommendations and Planned Interventions: bed mobility training, transfer training, gait training, therapeutic exercises and neuromuscular re-education      Frequency/Duration: Patient will be followed by physical therapy:  5 times a week to address goals. Recommendation for discharge: (in order for the patient to meet his/her long term goals)  Therapy 3 hours per day 5-7 days per week; IPR vs SNF    This discharge recommendation:  Has been made in collaboration with the attending provider and/or case management    Equipment recommendations for successful discharge (if) home: None noted          SUBJECTIVE:   Patient nodded to participation. ?    OBJECTIVE DATA SUMMARY:   HISTORY:    Past Medical History:   Diagnosis Date    A-fib ([facility]     Anemia     Anxiety     CAD (coronary artery disease) 12/10/2010    Chronic airway obstruction, not elsewhere classified 12/10/2010    Chronic systolic heart failure ([address] 12/10/2010    COPD (chronic obstructive pulmonary disease) (HCC)     GERD (gastroesophageal reflux disease)     ICD (implantable cardioverter-defibrillator) in place     Ischemic cardiomyopathy     [facility]peripheral [facility]     Tobacco use disorder    History reviewed. No pertinent surgical history.     Personal factors and/or comorbidities impacting plan of care:     Home Situation  Home Environment: Apartment  # Steps to Enter: 0  One/Two Story Residence: One story  Living Alone: No  Support Systems: Child(shirin), /social worker  Patient Expects to be Discharged to[de-identified] Apartment  Current DME Used/Available at Home: nicole Ward, Walker    EXAMINATION/PRESENTATION/DECISION MAKING:   Critical Behavior:  Neurologic State: Confused  Orientation Level: Oriented to person  Cognition: Follows commands, Decreased attention/concentration     Hearing: Auditory  Auditory Impairment: None  Skin:    Edema:   Range Of Motion:  AROM: Generally decreased, functional                       Strength:    Strength: Generally decreased, functional(impaired right post ? CVA)                    Tone & Sensation:   Tone: Abnormal                              Coordination:  Coordination: Generally decreased, functional  Vision:      Functional Mobility:  Bed Mobility:     Supine to Sit: Contact guard assistance     Scooting: Contact guard assistance  Transfers:  Sit to Stand: Minimum assistance  Stand to Sit: Minimum assistance                       Balance:   Sitting: Intact  Standing: Impaired  Standing - Static: Constant support; Fair  Standing - Dynamic : Fair  Ambulation/Gait Training:  Distance (ft): 25 Feet (ft)  Assistive Device: Gait belt;Walker, rolling  Ambulation - Level of Assistance:  Moderate assistance     Gait Description (WDL): Exceptions to WDL  Gait Abnormalities: Antalgic;Decreased step clearance;Shuffling gait        Base of Support: Widened  Stance: Left decreased  Speed/Shannan: Delayed  Step Length: Left shortened                      Physical Therapy Evaluation Charge Determination   History Examination Presentation Decision-Making   MEDIUM  Complexity : 1-2 comorbidities / personal factors will impact the outcome/ POC  LOW Complexity : 1-2 Standardized tests and measures addressing body structure, function, activity limitation and / or participation in recreation  LOW Complexity : Stable, uncomplicated  LOW Complexity : FOTO score of       Based on the above components, the patient evaluation is determined to be of the following complexity level: LOW     Pain Rating:  Intensity: 6 /10   Location: LLE    Description:aching   Aggravating factors: Activity Tolerance:   Fair  Please refer to the flowsheet for vital signs taken during this treatment. After treatment patient left:   Up in chair  Call light within reach  RN notified     COMMUNICATION/EDUCATION:   The patient?s plan of care was discussed with: Registered Nurse. Fall prevention education was provided and the patient/caregiver indicated understanding., Patient/family have participated as able in goal setting and plan of care. and Patient/family agree to work toward stated goals and plan of care.     Thank you for this referral.  Luke Reyes, PT, DPT   Time Calculation: 22 mins

## 2019-07-11 NOTE — PROGRESS NOTES
Bedside shift change report given to Conchita (oncoming nurse) by Mick Wagner (offgoing nurse).  Report included the following information SBAR, MAR, Recent Results and Cardiac Rhythm SR.

## 2019-07-11 NOTE — PROGRESS NOTES
BP 89/40. Pt reports dizziness while lying in bed. Lisinopril 10 mg started earlier today. Hgb 12.1 yesterday. D/w on call, Dr. Conrad Pepe made aware. 1L NS ordered and to hold lisinopril for now. Will continue to monitor BP closely.

## 2019-07-11 NOTE — PROGRESS NOTES
Problem: Falls - Risk of  Goal: *Absence of Falls  Description  Document Kerwin Acuna Fall Risk and appropriate interventions in the flowsheet.   Outcome: Progressing Towards Goal

## 2019-07-11 NOTE — PROGRESS NOTES
Bedside and Verbal shift change report given to [employer] (oncoming nurse) by Conchita (offgoing nurse). Report included the following information SBAR, Accordion, Recent Results and Cardiac Rhythm NSR.

## 2019-07-11 NOTE — PROGRESS NOTES
Patient visited by [facility] on Telemetry Unit on 7/11/2019.     Rev.  Lavonne Wood MDiv, [facility], [address] [facility] paging service: [phone])

## 2019-07-11 NOTE — PROGRESS NOTES
Problem: Self Care Deficits Care Plan (Adult)  Goal: *Acute Goals and Plan of Care (Insert Text)  Description  Occupational Therapy Goals  Initiated 7/11/2019  1. Patient will perform upper body dressing with supervision/set-up within 7 day(s). 2.  Patient will perform lower body dressing with supervision/set-up within 7 day(s). 3.  Patient will perform bathing with supervision/set-up within 7 day(s). 4.  Patient will perform toilet transfers with modified independence within 7 day(s). 5.  Patient will perform all aspects of toileting with modified independence within 7 day(s). 6.  Patient will utilize energy conservation techniques during functional activities with verbal cues within 7 day(s). 7. Patient will perform standing grooming at sink with supervision within 7 day(s). Outcome: Progressing Towards Goal    OCCUPATIONAL THERAPY EVALUATION  Patient: Lo Michael (85 y.o. male)  Date: 7/11/2019  Primary Diagnosis: Leg pain, left [M79.605]  Procedure(s) (LRB):  Left Leg Thrombectomy, Angiogram, Fasciotomy Left Lower Leg. (Left) 2 Days Post-Op   Precautions:   Fall    ASSESSMENT :  Based on the objective data described below, patient presents with Minimum assistance upper body ADLs, Moderate Assistance lower body ADLs, and Minimum assistance functional mobility. Patient was AAOx2, difficulty with communication, history of dementia and other stated past medical history. Patient's daughter arrived limiting further therapy but was able to clarify PLOF (see below) and assistance level at home. She will be unavailable during the day. The patient is below his baseline per the daughter and feel he would benefit from skill therapy following medical stability prior to dc home where he needs to be modified independent during the day.      The following are barriers to ADL independence while in acute care:   - Cognitive and/or behavioral: processing, sequencing, safety awareness and short term memory loss  - Medical condition: ROM, strength, functional endurance, standing balance, pain tolerance, medical history and motor planning    - Other:       Patient will benefit from skilled acute intervention to address the above impairments. Patient?s rehabilitation potential is considered to be Good    Discharge recommendations: Rehab: patient is working towards tolerating 3 hours of therapy (to regain functional baseline patient requires rehab)  If above is not an option then recommend: Rehab at skilled nursing facility (SNF) (to regain functional baseline patient requires rehab)    Barriers to discharging home, in addition to above listed impairments: family availability to assist.     Equipment recommendations for successful discharge (if) home: TBD following rehabilitation       PLAN :  Recommendations and Planned Interventions: self care training, functional mobility training, therapeutic exercise, balance training, therapeutic activities, cognitive retraining, neuromuscular re-education, home safety training and family training/education    Frequency/Duration: Patient will be followed by occupational therapy 5 times a week to address goals. SUBJECTIVE:   Patient stated ? I need cream.?- for his coffee     OBJECTIVE DATA SUMMARY:   HISTORY:   Past Medical History:   Diagnosis Date    [facility]     Anemia     Anxiety     CAD (coronary artery disease) 12/10/2010    Chronic airway obstruction, not elsewhere classified 12/10/2010    Chronic systolic heart failure ([address] 12/10/2010    COPD (chronic obstructive pulmonary disease) (HCC)     GERD (gastroesophageal reflux disease)     ICD (implantable cardioverter-defibrillator) in place     Ischemic cardiomyopathy     PAD (peripheral artery disease) ([address]     Tobacco use disorder    History reviewed. No pertinent surgical history.     Prior Level of Function/Environment/Context: Per the patient's daughter: he has a nurse at home that was coming a couple times a week to assist with ADLs but had stopped a week prior to admission. He was not using any AD in the home and was able to ambulate to and from the bathroom and around the home without assistance. Use of RW outside the home. Expanded or extensive additional review of patient history:     Home Situation  Home Environment: Apartment  # Steps to Enter: 0  One/Two Story Residence: One story  Living Alone: No  Support Systems: Child(shirin), /  Patient Expects to be Discharged to[de-identified] Apartment  Current DME Used/Available at Home: [address], [place], quad, Walker    Hand dominance: Left    EXAMINATION OF PERFORMANCE DEFICITS:  Cognitive/Behavioral Status:  Neurologic State: Alert;Confused  Orientation Level: Disoriented to time;Disoriented to place  Cognition: Follows commands  Perception: Appears intact  Perseveration: No perseveration noted  Safety/Judgement: Awareness of environment    Skin: all visible areas intact    Edema: none noted    Hearing: Auditory  Auditory Impairment: None    Vision/Perceptual:     Appears WNL    Range of Motion:    AROM: Grossly decreased, non-functional  PROM: Generally decreased, functional   Baseline RUE tone abnormalities                    Strength:    Strength: Generally decreased, functional     RUE Strength  R Shoulder Flexion: 3-  R Shoulder ABduction: 3-  R Elbow Flexion: 3-  R : 4          Coordination:  Coordination: Grossly decreased, non-functional  Fine Motor Skills-Upper: Left Intact; Right Impaired    Gross Motor Skills-Upper: Left Intact; Right Impaired    Tone & Sensation:    Tone: Abnormal  Sensation: Impaired                      Balance:  Sitting: Intact  Standing: Impaired  Standing - Static: Constant support  Standing - Dynamic : Fair    Functional Mobility and Transfers for ADLs:  Bed Mobility:  Supine to Sit: Contact guard assistance  Scooting: Contact guard assistance    Transfers:  Sit to Stand: Minimum assistance  Stand to Sit: Minimum assistance  Toilet Transfer : Minimum assistance    ADL Assessment: Skillfully inferred from ROM/Strength assessment as well as brief functional mobility completed   Feeding: Setup    Oral Facial Hygiene/Grooming: Setup    Bathing: Moderate assistance    Upper Body Dressing: Minimum assistance    Lower Body Dressing: Moderate assistance    Toileting: Moderate assistance             Cognitive Retraining  Safety/Judgement: Awareness of environment    Functional Measure:  Barthel Index:    Bathin  Bladder: 5  Bowels: 5  Groomin  Dressin  Feedin  Mobility: 5  Stairs: 0  Toilet Use: 5  Transfer (Bed to Chair and Back): 10  Total: 40/100        Percentage of impairment   0%   1-19%   20-39%   40-59%   60-79%   80-99%   100%   Barthel Score 0-100 100 99-80 79-60 59-40 20-39 1-19   0     The Barthel ADL Index: Guidelines  1. The index should be used as a record of what a patient does, not as a record of what a patient could do. 2. The main aim is to establish degree of independence from any help, physical or verbal, however minor and for whatever reason. 3. The need for supervision renders the patient not independent. 4. A patient's performance should be established using the best available evidence. Asking the patient, friends/relatives and nurses are the usual sources, but direct observation and common sense are also important. However direct testing is not needed. 5. Usually the patient's performance over the preceding 24-48 hours is important, but occasionally longer periods will be relevant. 6. Middle categories imply that the patient supplies over 50 per cent of the effort. 7. Use of aids to be independent is allowed. Ellen Gallegos., Barthel, D.W. (1430). Functional evaluation: the Barthel Index. [facility] (14)2. Fuad Wood pat BROCK Romero, David Arciniega., An Mccarthy., Kerline, [facility] ().  Measuring the change indisability after inpatient rehabilitation; comparison of the responsiveness of the Barthel Index and Functional George Measure. Journal of Neurology, Neurosurgery, and Psychiatry, 66(4), 645-62. DESIREE Hardwick, KAITLYNN Arreola, & Jonathan Nuñez M.A. (2004.) Assessment of post-stroke quality of life in cost-effectiveness studies: The usefulness of the Barthel Index and the EuroQoL-5D. Quality of Life Research, 15, 437-20        Occupational Therapy Evaluation Charge Determination   History Examination Decision-Making   MEDIUM Complexity : Expanded review of history including physical, cognitive and psychosocial  history  MEDIUM Complexity : 3-5 performance deficits relating to physical, cognitive , or psychosocial skils that result in activity limitations and / or participation restrictions MEDIUM Complexity : Patient may present with comorbidities that affect occupational performnce. Miniml to moderate modification of tasks or assistance (eg, physical or verbal ) with assesment(s) is necessary to enable patient to complete evaluation       Based on the above components, the patient evaluation is determined to be of the following complexity level: MEDIUM  Pain:  No pain reported    Activity Tolerance:   Good      After treatment patient left:   Up in chair  Call light within reach  Family at bedside     COMMUNICATION/EDUCATION:   The patient?s plan of care was discussed with: Physical Therapist.    Home safety education was provided and the patient/caregiver indicated understanding., Patient/family have participated as able in goal setting and plan of care. and Patient/family agree to work toward stated goals and plan of care. This patient?s plan of care is appropriate for delegation to \[facility] [place] [facility]\"".     Thank you for this referral.  Wayne Blood  Time Calculation: 9 mins

## 2019-07-11 NOTE — PROGRESS NOTES
Reason for Admission:  Left leg pain                   RRAT Score:  15                Do you (patient/family) have any concerns for transition/discharge? Health challenges                Plan for utilizing home health: No      Current Advanced Directive/Advance Care Plan:  Not ACP in the file. Transition of Care Plan:  CM met with patient and his daughter Johanna Weber [phone] to discuss transition of care. Patient lives with his daughter. He is alert, oriented x 2. He needs assistance with all activities of daily living. Patient uses a cane for safe ambulation. Patient was being followed by [facility] and was receiving PT 3 x a week prior to admission. CM discussed PT/OT recommendations for a skilled rehab and offered a choice. Daughter [facility] [phone] due to their past experience. Freedom of Choice form signed and placed in patient's chart. [facility] [address] [facility] Aaron also spoke with Huffman Maritza in Admissions regarding possible discharge tomorrow. Patient's daughter will provide transportation home. CM will continue to follow for transition of care. Davide Gaytan MSA, RN, CRM. Care Management Interventions  PCP Verified by CM: Yes  Palliative Care Criteria Met (RRAT>21 & CHF Dx)?: No  Mode of Transport at Discharge:  Other (see comment)(Private car)  Transition of Care Consult (CM Consult): SNF  Partner SNF: No  Reason Why Partner SNF Not Chosen: Positive previous encounter  MyChart Signup: No  Discharge Durable Medical Equipment: No  Health Maintenance Reviewed: Yes  Physical Therapy Consult: Yes  Occupational Therapy Consult: Yes  Speech Therapy Consult: No  Current Support Network: Lives with Caregiver  Confirm Follow Up Transport: Family  Plan discussed with Pt/Family/Caregiver: Yes  Freedom of Choice Offered: Yes  Discharge Location  Discharge Placement: Skilled nursing facility

## 2019-07-11 NOTE — PROGRESS NOTES
Vascular    No events, wound vac placed, no compliants  Visit Vitals  /60 (BP 1 Location: Right arm, BP Patient Position: At rest)   Pulse 86   Temp 98.3 °F (36.8 °C)   Resp 16   Wt 78.5 kg (173 lb 1 oz)   SpO2 98%   BMI 29.71 kg/m²     Left calf dressed  Left groin clean  Foot warm and dry with signals, M/S intact     59 y/o WM with acute left lower extremity ischemia, s/p thrombectomy and fasciotomies  - Foot looks good, motor/sensory intact, low dos eliquis today  - PT/OT, OOB- will be ready for dispo tomorrow

## 2019-07-12 VITALS
SYSTOLIC BLOOD PRESSURE: 99 MMHG | RESPIRATION RATE: 16 BRPM | HEART RATE: 79 BPM | DIASTOLIC BLOOD PRESSURE: 48 MMHG | BODY MASS INDEX: 24.37 KG/M2 | TEMPERATURE: 97.6 F | OXYGEN SATURATION: 98 % | WEIGHT: 141.98 LBS

## 2019-07-12 LAB
ERYTHROCYTE [DISTWIDTH] IN BLOOD BY AUTOMATED COUNT: 13.5 % (ref 11.5–14.5)
HCT VFR BLD AUTO: 34.9 % (ref 36.6–50.3)
HGB BLD-MCNC: 10.9 G/DL (ref 12.1–17)
MCH RBC QN AUTO: 31.9 PG (ref 26–34)
MCHC RBC AUTO-ENTMCNC: 31.2 G/DL (ref 30–36.5)
MCV RBC AUTO: 102 FL (ref 80–99)
NRBC # BLD: 0 K/UL (ref 0–0.01)
NRBC BLD-RTO: 0 PER 100 WBC
PLATELET # BLD AUTO: 114 K/UL (ref 150–400)
PMV BLD AUTO: 11.5 FL (ref 8.9–12.9)
RBC # BLD AUTO: 3.42 M/UL (ref 4.1–5.7)
WBC # BLD AUTO: 9.3 K/UL (ref 4.1–11.1)

## 2019-07-12 PROCEDURE — 74011250637 HC RX REV CODE- 250/637: Performed by: SURGERY

## 2019-07-12 PROCEDURE — 85027 COMPLETE CBC AUTOMATED: CPT

## 2019-07-12 PROCEDURE — 36415 COLL VENOUS BLD VENIPUNCTURE: CPT

## 2019-07-12 PROCEDURE — 77030009526 HC GEL CARSYN MDII -A

## 2019-07-12 PROCEDURE — 97116 GAIT TRAINING THERAPY: CPT

## 2019-07-12 RX ORDER — OXYCODONE AND ACETAMINOPHEN 5; 325 MG/1; MG/1
1 TABLET ORAL
Qty: 50 TAB | Refills: 0 | Status: SHIPPED | OUTPATIENT
Start: 2019-07-12 | End: 2019-07-19

## 2019-07-12 RX ADMIN — OXYCODONE AND ACETAMINOPHEN 1 TABLET: 5; 325 TABLET ORAL at 04:22

## 2019-07-12 RX ADMIN — ASPIRIN 81 MG 81 MG: 81 TABLET ORAL at 08:20

## 2019-07-12 RX ADMIN — GABAPENTIN 100 MG: 100 CAPSULE ORAL at 08:19

## 2019-07-12 RX ADMIN — OXYCODONE AND ACETAMINOPHEN 1 TABLET: 5; 325 TABLET ORAL at 12:41

## 2019-07-12 RX ADMIN — APIXABAN 2.5 MG: 2.5 TABLET, FILM COATED ORAL at 08:20

## 2019-07-12 NOTE — PROGRESS NOTES
[facility]    Romayne Orleans, MSW

## 2019-07-12 NOTE — WOUND CARE
WOCN Note: Follow-up visit for removal of wound VAC prior to discharge today. Chart shows: 
Admitted for left fem-pop thrombectomy on 7/10 with fasciotomy to left lower leg. History of dementia.  
  
Assessment:  
Communicative, continent, and mobile. Bed: total care Tolerates dressing change well.  
  
Left lower leg fasciotomy sites: 
Medial = 12 x 3 x 1.2 cm Lateral = 10.5 x 2.3 x 0.2 cm Moist, red, freshly-debrided bases with exposed muscle. No bleeding or vessels noted. There is scant serosanguinous drainage noted in VAC canister. No redness noted to periwound. No edema noted to lower leg. Cleaned with saline and moist dressing applied with Carrasyn gel, ABD and loose ACE wrap.  
  
Recommendations:   
Continue VAC after discharge as ordered @ 50 mmHg continuous suction with twice weekly dressing changes. Minimize layers of linen/pads under patient to optimize support surface. Turn/reposition approximately every 2 hours and offload heels. Aloe Vesta cream to buttocks and sacrum daily and as needed 
  
Discussed above plan with RN. 
  
Transition of Care: Plan to follow as needed while admitted to hospital.  Discussed discharge plan with Dr. Turner Ly and , Cy Lees. Elma Ayon, CALLUMN, RN, [employer] Certified Wound, Ostomy, Continence Nurse 
office [phone] 
pager [phone] or call  to page

## 2019-07-12 NOTE — PROGRESS NOTES
Bedside shift change report given to Devan Ferreira (oncoming nurse) by Mika Calixto (offgoing nurse).  Report included the following information SBAR, MAR, Recent Results and Cardiac Rhythm SR.

## 2019-07-12 NOTE — PROGRESS NOTES
Bedside shift change report given to Chandan Lin RN   (oncoming nurse) by Yong Ackerman (offgoing nurse). Report included the following information SBAR, Med Rec Status and Cardiac Rhythm NSR with BBB.

## 2019-07-12 NOTE — PROGRESS NOTES
Daughter arrived. Patient set up for lunch. Updated daughter regarding Wound Vac removed and will be restarted when patient arrives at [facility].

## 2019-07-12 NOTE — ROUTINE PROCESS
Bedside shift change report given to Fei Samson RN (oncoming nurse) by Jazmin Lopez RN (offgoing nurse). Report included the following information SBAR, Kardex, ED Summary, Procedure Summary, Intake/Output, MAR, Accordion, Recent Results and Cardiac Rhythm NSR/Paced.

## 2019-07-12 NOTE — DISCHARGE SUMMARY
Physician Discharge Summary     Patient ID:  Karla Ashton  [id number]  1 y.o.  1955    Allergies: Codeine and Morphine    Admit date: 7/9/2019    Discharge date: 7/12/2019      Admitting Physician: Pieter Espitia MD     Discharge Physician: Pieter Esptiia MD    * Admission Diagnoses: Leg pain, left [M79.605]    * Discharge Diagnoses:   Hospital Problems as of 7/12/2019 Date Reviewed: 7/9/2019          Codes Class Noted - Resolved POA    Leg pain, left ICD-10-CM: M79.605  ICD-9-CM: 729.5  7/9/2019 - Present Unknown              * Procedures for this admission: Procedure(s):  Left Leg Thrombectomy, Angiogram, Fasciotomy Left Lower Leg. * Discharged Condition: improved    * Hospital Course: Mr. Marielle Sibley presented with an ischemic left foot, he underwent a thrombectomy and fasciotomy. His course was uneventful. This was presumed to be due to advanced PVD as the casue of failure. He needs to stay on Eliquis, we started low dose. A wound vac was placed on his lower leg for his fasciotomy sites. Discharge Exam: Awake and alert, leg warm and dry, wound vac in place    * Disposition: [facility])    Discharge Medications:   Current Discharge Medication List      START taking these medications    Details   oxyCODONE-acetaminophen (PERCOCET) 5-325 mg per tablet Take 1 Tab by mouth every six (6) hours as needed for Pain for up to 7 days. Max Daily Amount: 4 Tabs. Qty: 50 Tab, Refills: 0    Associated Diagnoses: Ischemic foot         CONTINUE these medications which have CHANGED    Details   apixaban (ELIQUIS) 2.5 mg tablet Take 1 Tab by mouth two (2) times a day. Indications: Treatment to Prevent Blood Clots in Chronic Atrial Fibrillation  Qty: 60 Tab, Refills: 3    Associated Diagnoses: Ischemic foot         CONTINUE these medications which have NOT CHANGED    Details   aspirin 81 mg chewable tablet Take 1 Tab by mouth daily.   Qty: 120 Tab, Refills: 0    Associated Diagnoses: Ischemia of right lower extremity      carvedilol (COREG) 3.125 mg tablet Take 1 Tab by mouth two (2) times daily (with meals). Qty: 180 Tab, Refills: 3    Associated Diagnoses: Ischemia of right lower extremity      gabapentin (NEURONTIN) 100 mg capsule Take 1 Cap by mouth three (3) times daily. Qty: 90 Cap, Refills: 3    Associated Diagnoses: Ischemia of right lower extremity      HYDROcodone-acetaminophen (NORCO) 7.5-325 mg per tablet Take 1 Tab by mouth every four (4) hours as needed. Max Daily Amount: 6 Tabs. Qty: 75 Tab, Refills: 0    Associated Diagnoses: Ischemia of right lower extremity      therapeutic multivitamin (THERAGRAN) tablet Take 1 Tab by mouth daily. lisinopril (PRINIVIL, ZESTRIL) 10 mg tablet Take 10 mg by mouth daily. * Follow-up Care/Patient Instructions: Activity: Activity as tolerated  Diet: Regular Diet  Wound Care: Keep wound clean and dry    Follow-up Information     Follow up With Specialties Details Why Contact Info    Jono Musa MD Vascular Surgery In 2 weeks  [address] [facility] [address] [phone]            Signed:   Vanessa Leach MD  7/12/2019  6:28 AM

## 2019-07-12 NOTE — PROGRESS NOTES
PHYSICAL THERAPY TREATMENT  Patient: Je Toro (65 y.o. male)  Date: 7/12/2019  Diagnosis: Leg pain, left [M79.605] <principal problem not specified>  Procedure(s) (LRB):  Left Leg Thrombectomy, Angiogram, Fasciotomy Left Lower Leg. (Left) 3 Days Post-Op  Precautions: Fall  Chart, physical therapy assessment, plan of care and goals were reviewed. ASSESSMENT: Patient generally needing min assist for all mobility today along with assist for walker management and wound vac management. He deferred sitting in chair secondary to pain. Continue to recommend SNF for rehab before returning home. Progression toward goals:  ?    Improving appropriately and progressing toward goals  ? Improving slowly and progressing toward goals  ? Not making progress toward goals and plan of care will be adjusted     PLAN:  Patient continues to benefit from skilled intervention to address the above impairments. Continue treatment per established plan of care. Discharge Recommendations:  Skilled Nursing Facility  Further Equipment Recommendations for Discharge: will continue to assist     SUBJECTIVE:   Patient stated ? yes? Response when therapist asked if he was in pain    OBJECTIVE DATA SUMMARY:   Critical Behavior:  Neurologic State: Alert, Confused  Orientation Level: Oriented to person, Disoriented to time, Disoriented to situation, Disoriented to place  Cognition: Decreased attention/concentration  Safety/Judgement: Awareness of environment  Functional Mobility Training:  Bed Mobility:   Supine to Sit: Minimum assistance     Scooting: Minimum assistance   Transfers:  Sit to Stand: Minimum assistance  Stand to Sit: Minimum assistance         Balance:  Sitting: Impaired  Sitting - Static: Fair (occasional)  Sitting - Dynamic: Fair (occasional)  Standing: Impaired; With support  Standing - Static: Fair  Standing - Dynamic : Poor  Ambulation/Gait Training:  Distance (ft): 65 Feet (ft)  Assistive Device: Gait belt;Walker, rolling  Ambulation - Level of Assistance: Minimal assistance(for walker management and balance)   Gait Abnormalities: Antalgic;Trunk sway increased(difficulty with foot placement on left)   Stance: Left decreased  Speed/Shannan: Slow       Pain:  Pain Scale 1: Numeric (0 - 10)  Pain Intensity 1: 0              Activity Tolerance:   Sitting HR 88 /58 Post activity HR 77 /45  Please refer to the flowsheet for vital signs taken during this treatment. After treatment:   ?    Patient left in no apparent distress sitting up in chair  ? Patient left in no apparent distress in bed  ? Call bell left within reach  ? Nursing notified  ? Caregiver present  ?     Bed alarm activated    COMMUNICATION/COLLABORATION:   The patient?s plan of care was discussed with: Registered Nurse    Ramila Petersen, PT   Time Calculation: 13 mins

## 2019-07-12 NOTE — PROGRESS NOTES
8:00 am. CM offered screening for Medicaid Long-Term Services & Supports. Patient Consented to screening. UAI done on 7/11/19.    9:45 am. CM called [facility] to notify them of discharge. Ivania Garibay said they could  to admit patient today. Nurse to call [phone] and to ask for  the [facility]. CM to fax the DMAS 95 and d/c instructions to [phone]. CM noted that UAI was denied. CM notified patient's daughter of same and encouraged her to request for another UAI if she could no longer meets her father's needs. Copies of UAI placed in patient's chart for scanning. CM awaiting to hear from JAYASHREE Diallo if patient needs to be discharged to SNF with a wound vac.   Dom Blanco MSA, RN, CRM

## 2019-08-08 ENCOUNTER — ED HISTORICAL/CONVERTED ENCOUNTER (OUTPATIENT)
Dept: OTHER | Age: 64
End: 2019-08-08

## 2019-10-23 ENCOUNTER — IP HISTORICAL/CONVERTED ENCOUNTER (OUTPATIENT)
Dept: OTHER | Age: 64
End: 2019-10-23

## 2019-12-05 ENCOUNTER — OFFICE VISIT (OUTPATIENT)
Dept: CARDIOLOGY CLINIC | Age: 64
End: 2019-12-05

## 2019-12-05 DIAGNOSIS — Z45.02 ICD (IMPLANTABLE CARDIOVERTER-DEFIBRILLATOR) DISCHARGE: ICD-10-CM

## 2019-12-05 DIAGNOSIS — I47.29 PAROXYSMAL VENTRICULAR TACHYCARDIA: Primary | ICD-10-CM

## 2019-12-15 ENCOUNTER — OP HISTORICAL/CONVERTED ENCOUNTER (OUTPATIENT)
Dept: OTHER | Age: 64
End: 2019-12-15

## 2019-12-16 ENCOUNTER — TELEPHONE (OUTPATIENT)
Dept: CARDIOLOGY CLINIC | Age: 64
End: 2019-12-16

## 2019-12-16 NOTE — TELEPHONE ENCOUNTER
[facility] is calling to get patient scheduled to see EP doctor because pacemaker keeps \"going off\" They would like first available EP to see patient within the next week. They ask that you reach patient's daughter, Veto Rodriguez, at [phone] to let them know if can work in because patient has dementia.

## 2020-03-06 ENCOUNTER — IP HISTORICAL/CONVERTED ENCOUNTER (OUTPATIENT)
Dept: OTHER | Age: 65
End: 2020-03-06

## 2020-03-16 ENCOUNTER — OP HISTORICAL/CONVERTED ENCOUNTER (OUTPATIENT)
Dept: OTHER | Age: 65
End: 2020-03-16

## 2022-09-02 NOTE — PERIOP NOTES
TRANSFER - OUT REPORT:    Verbal report given to Jose on Claudette Form  being transferred to [phone] for routine progression of care       Report consisted of patients Situation, Background, Assessment and   Recommendations(SBAR). Time Pre op antibiotic given:4/7/2018  Anesthesia Stop time: 2147 4/7/2018  Mcclure Present on Transfer to floor:yes  Order for Mcclure on Chartyes  Discharge Prescriptions with Chart:none    Information from the following report(s) OR Summary, Procedure Summary, Intake/Output, MAR, Accordion, Recent Results, Med Rec Status and Cardiac Rhythm sinus tom, normal sinus with BBB, pac, pvc was reviewed with the receiving nurse. Opportunity for questions and clarification was provided. Is the patient on 02? no    Room air    Is the patient on a monitor? YES    Is the nurse transporting with the patient? YES    Surgical Waiting Area notified of patient's transfer from PACU?  YES      The following personal items collected during your admission accompanied patient upon transfer:   Dental Appliance: Dental Appliances: None  Vision:    Hearing Aid:    Jewelry:    Clothing: Clothing: None  Other Valuables:    Valuables sent to safe: Patient is not pregnant (male or female)

## 2022-10-08 NOTE — PROGRESS NOTES
ELIESER Problem: Mobility Impaired (Adult and Pediatric)  Goal: *Acute Goals and Plan of Care (Insert Text)  Physical Therapy Goals  Initiated 4/9/2018  1. Patient will move from supine to sit and sit to supine, scoot up and down and roll side to side in bed with independence within 7 day(s). 2.  Patient will transfer from bed to chair and chair to bed with supervision/set-up using the least restrictive device within 7 day(s). 3.  Patient will perform sit to stand with contact guard assist within 7 day(s). 4.  Patient will ambulate with moderate assistance for 25 feet with the least restrictive device within 7 day(s). 5.  Stair goal to be addressed when appropriate. 6.  Patient will attempt a 6 MWT prior to discharge for diagnosis of HF.      physical Therapy EVALUATION  Patient: Stephanie Kwon (64 y.o. male)  Date: 4/9/2018  Primary Diagnosis: right femoral clot  Ischemia of right lower extremity  Procedure(s) (LRB):  THROMBECTOMY/EMBOLECTOMY  RIGHT LOWER EXTREMITY WITH INTRAOPERATIVE ANGIOGRAM, ILIAC STENT, FASCIOTOMY (Right) 2 Days Post-Op   Precautions:  Fall, WBAT, Bed/Chair Alarm    ASSESSMENT :  Based on the objective data described below, the patient presents with impaired functional mobility compared to baseline following recent fall, subsequent inability to ambulate, and loss of R pedal pulse. Patient underwent an iliac stent and 4 compartment fasciotomy. Of note, patient has a history of dementia (poor historian). Per patient, he lives with his daughter who is his \"primary caregiver. \" She assists him out of bed, when up ambulating without an AD, and with bathing/dressing. During session, patient oriented to self and place, but disoriented to situation. Patient reporting significant pain in R LE (8/10) and often observed him guarding it. Light touch sensation impaired in R LE from inferior patella into all 5 toes. Patient illustrated functional strength in B LEs, except R ankle (2-/5 DF/PF).  Overall, patient completed bed mobility with standby assist and additional time and was able to laterally scoot at EOB with standby assist (patient only toe-touch on the R foot despite cues for flat-foot; patient may be starting to illustrate drop foot and shortened/contracted achilles tendon). Patient was able to stand with minimal assist and to a rolling walker. In standing, patient heavily reliant on walker support and minimally weight-bearing through his R LE (toe-touch only). However, patient demonstrated ability to pivot on his L LE into chair and then back to bed (chair at bedside not appropriate for patient to sit in). Patient may need SNF placement pending patient's ability to ambulate (will try and initiate tomorrow). Patient has a diagnosis of HF - will need to attempt a 6 MWT prior to discharge if discharges home. Patient will benefit from skilled intervention to address the above impairments. Patients rehabilitation potential is considered to be Fair  Factors which may influence rehabilitation potential include:   []         None noted  []         Mental ability/status  []         Medical condition  []         Home/family situation and support systems  [x]         Safety awareness  [x]         Pain tolerance/management  []         Other:      PLAN :  Recommendations and Planned Interventions:  [x]           Bed Mobility Training             []    Neuromuscular Re-Education  [x]           Transfer Training                   []    Orthotic/Prosthetic Training  [x]           Gait Training                         []    Modalities  [x]           Therapeutic Exercises           [x]    Edema Management/Control  [x]           Therapeutic Activities            [x]    Patient and Family Training/Education  []           Other (comment):    Frequency/Duration: Patient will be followed by physical therapy  5 times a week to address goals.   Discharge Recommendations: Possible SNF placement  Further Equipment Recommendations for Discharge: TBD     SUBJECTIVE:   Patient stated It just hurts so bad.     OBJECTIVE DATA SUMMARY:   HISTORY:    Past Medical History:   Diagnosis Date    CAD (coronary artery disease) 12/10/2010    Chronic airway obstruction, not elsewhere classified 12/10/2010    Chronic systolic heart failure ([facility] [address] 12/10/2010    [facility]     GERD (gastroesophageal reflux disease)     ICD (implantable cardioverter-defibrillator) in place     Ischemic cardiomyopathy     PAD (peripheral artery disease) ([facility] [address]     Tobacco use disorder    History reviewed. No pertinent surgical history. Prior Level of Function/Home Situation: See assessment above. Personal factors and/or comorbidities impacting plan of care: PAD, COPD, Hx of dementia, CAD, HF, emergent R LE fasciotomy     Home Situation  Home Environment: Private residence  Living Alone: No  Support Systems: Child(shirin) (Daughter)  Patient Expects to be Discharged to[de-identified] Unknown  Current DME Used/Available at Home: None    EXAMINATION/PRESENTATION/DECISION MAKING:   Critical Behavior:  Neurologic State: Alert  Orientation Level: Oriented to person, Oriented to place, Disoriented to situation  Cognition: Follows commands, Memory loss, Poor safety awareness  Skin:  R LE fasciotomy dressing c/d/i  Edema: R distal LE edema  Range Of Motion:  AROM: Generally decreased, functional (Except R ankle DF/PF)   PROM: Difficult to attain neutral DF on R (pain, edema, shorted achilles tendon? ? )  Strength:    Strength: Generally decreased, functional (In L LE; R ankle DF/PF 2-/5)  Tone & Sensation:   Tone: Normal  Sensation: Impaired (R LE: light touch absent from distal patella to toes)  Coordination:  Coordination: Within functional limits  Functional Mobility:  Bed Mobility:  Supine to Sit: Stand-by assistance; Additional time  Sit to Supine: Stand-by assistance; Additional time  Scooting: Stand-by assistance; Additional time (Towards HOB, lateral scooting, a/p scooting in sitting)  Transfers:  Sit to Stand: Assist x1;Minimum assistance; Additional time  Stand to Sit: Assist x1;Minimum assistance; Additional time  Stand Pivot Transfers: Assist x1 (Heavy Min A - pivoted on L LE)     Balance:   Sitting: Intact; With support; Without support  Standing: Impaired; With support  Standing - Static: Fair  Standing - Dynamic : Poor    Functional Measure:  Barthel Index:    Bathin  Bladder: 0  Bowels: 0  Groomin  Dressin  Feeding: 10  Mobility: 0  Stairs: 0  Toilet Use: 5  Transfer (Bed to Chair and Back): 5  Total: 25       Barthel and G-code impairment scale:  Percentage of impairment CH  0% CI  1-19% CJ  20-39% CK  40-59% CL  60-79% CM  80-99% CN  100%   Barthel Score 0-100 100 99-80 79-60 59-40 20-39 1-19   0   Barthel Score 0-20 20 17-19 13-16 9-12 5-8 1-4 0      The Barthel ADL Index: Guidelines  1. The index should be used as a record of what a patient does, not as a record of what a patient could do. 2. The main aim is to establish degree of independence from any help, physical or verbal, however minor and for whatever reason. 3. The need for supervision renders the patient not independent. 4. A patient's performance should be established using the best available evidence. Asking the patient, friends/relatives and nurses are the usual sources, but direct observation and common sense are also important. However direct testing is not needed. 5. Usually the patient's performance over the preceding 24-48 hours is important, but occasionally longer periods will be relevant. 6. Middle categories imply that the patient supplies over 50 per cent of the effort. 7. Use of aids to be independent is allowed. Evelyne Schneider., Barthel, D.W. ([id number]). Functional evaluation: the Barthel Index. [address] [facility] (14)2. BROCK Bunn, Federica Jordan., Randall Khanr. [place], [address] [place] [address] ().  Measuring the change indisability after inpatient rehabilitation; comparison of the responsiveness of the Barthel Index and Functional [place] Measure. Journal of Neurology, Neurosurgery, and Psychiatry, 66(8), 493-539. ESPINOZA Kirk.WALESKA, KAITLYNN Arreola, & Akila Tanner M.A. (2004.) Assessment of post-stroke quality of life in cost-effectiveness studies: The usefulness of the Barthel Index and the EuroQoL-5D. Quality of Life Research, 13, 181-81     G codes: In compliance with CMSs Claims Based Outcome Reporting, the following G-code set was chosen for this patient based on their primary functional limitation being treated: The outcome measure chosen to determine the severity of the functional limitation was the Barthel with a score of 25/100 which was correlated with the impairment scale. ? Mobility - Walking and Moving Around:     - CURRENT STATUS: CL - 60%-79% impaired, limited or restricted    - GOAL STATUS: CK - 40%-59% impaired, limited or restricted    - D/C STATUS:  ---------------To be determined---------------      Physical Therapy Evaluation Charge Determination   History Examination Presentation Decision-Making   HIGH Complexity :3+ comorbidities / personal factors will impact the outcome/ POC  HIGH Complexity : 4+ Standardized tests and measures addressing body structure, function, activity limitation and / or participation in recreation  LOW Complexity : Stable, uncomplicated  Other outcome measures Barthel  MEDIUM      Based on the above components, the patient evaluation is determined to be of the following complexity level: LOW     Pain:  Pain Scale 1: Numeric (0 - 10)  Pain Intensity 1: 7  Pain Location 1: Leg  Pain Orientation 1: Right  Pain Description 1: Aching  Pain Intervention(s) 1: Cold pack  Activity Tolerance:  Please refer to the flowsheet for vital signs taken during this treatment.   After treatment:   [x]         Patient left in no apparent distress sitting up in chair  []         Patient left in no apparent distress in bed  []         Call bell left within reach  [x]         Nursing notified  []         Caregiver present  [x]         Bed alarm activated    COMMUNICATION/EDUCATION:   The patients plan of care was discussed with: Registered Nurse and Rehabilitation Attendant. [x]         Fall prevention education was provided and the patient/caregiver indicated understanding. [x]         Patient/family have participated as able in goal setting and plan of care. [x]         Patient/family agree to work toward stated goals and plan of care. []         Patient understands intent and goals of therapy, but is neutral about his/her participation. []         Patient is unable to participate in goal setting and plan of care.     Thank you for this referral.  Johnnie Self PT, DPT   Time Calculation: 15 mins

## 2023-10-24 NOTE — PERIOP NOTES
Dr Luther Robledo bedside to eval patient. Updated on status. Gabapentin Counseling: I discussed with the patient the risks of gabapentin including but not limited to dizziness, somnolence, fatigue and ataxia.

## 2024-02-12 NOTE — PERIOP NOTES
From: Candelario Bliss Jr.  To: Alesha Kinsergey  Sent: 2/12/2024 9:18 AM EST  Subject: Lantus, pen needles and Ultam    Good morning Alesha!    Candelario is fully on Medicare Part B with a Part D drug plan and our prescriptions are now filled at [place] [address], phone number [phone].  Candelario needs a refill of his Lantus 18 units nightly and the pen needles. He also has had a bottle of Ultram from his fall last May that he uses periodically. He only has 2 left and he woke up with pain in his right chest where drain is this am and took one. Any chance he could have a small bottle of these for pain. He cannot take tylenol or motrin with his liver and hx GI bleeds.  Thank you very much  Martha   TRANSFER - OUT REPORT:    Verbal report given to JOELLE Castro on Zuhair Rascon  being transferred to [phone] for routine post - op       Report consisted of patients Situation, Background, Assessment and   Recommendations(SBAR). Time Pre op antibiotic given:1015  Anesthesia Stop time: 1059  [place] Present on Transfer to floor:no  Order for Mcclure on Chart:n/a  Discharge Prescriptions with Chart:n/a    Information from the following report(s) SBAR, Kardex, OR Summary, Procedure Summary, Intake/Output and MAR was reviewed with the receiving nurse. Opportunity for questions and clarification was provided. Is the patient on 02? YES       L/Min 2       Other     Is the patient on a monitor? NO    Is the nurse transporting with the patient? NO    Surgical Waiting Area notified of patient's transfer from PACU?  YES      The following personal items collected during your admission accompanied patient upon transfer:   Dental Appliance: Dental Appliances: None  Vision: Visual Aid: Glasses, At bedside  Hearing Aid:    Jewelry:    Clothing: Clothing: None  Other Valuables:    Valuables sent to safe:

## (undated) DEVICE — VASCULAR-RICHMOND-LF: Brand: MEDLINE INDUSTRIES, INC.

## (undated) DEVICE — STERILE POLYISOPRENE POWDER-FREE SURGICAL GLOVES WITH EMOLLIENT COATING: Brand: PROTEXIS

## (undated) DEVICE — SUPER SPONGES,MEDIUM: Brand: DERMACEA

## (undated) DEVICE — GAUZE SPONGES,12 PLY: Brand: CURITY

## (undated) DEVICE — SUTURE VCRL SZ 2-0 L36IN ABSRB UD L40MM CT 1/2 CIR J957H

## (undated) DEVICE — DERMABOND SKIN ADH 0.7ML -- DERMABOND ADVANCED 12/BX

## (undated) DEVICE — SUTURE GORTX SZ 4-0 L24IN NONABSORBABLE L13MM PT-13 3/8 CIR 5K08A

## (undated) DEVICE — FOGARTY EMBOLECTOMY CATHETER: Brand: FOGARTY

## (undated) DEVICE — PINNACLE INTRODUCER SHEATH: Brand: PINNACLE

## (undated) DEVICE — DRAPE,REIN 53X77,STERILE: Brand: MEDLINE

## (undated) DEVICE — KERLIX BANDAGE ROLL: Brand: KERLIX

## (undated) DEVICE — FOGARTY ARTERIAL EMBOLECTOMY CATHETER 3F 80CM: Brand: FOGARTY

## (undated) DEVICE — REM POLYHESIVE ADULT PATIENT RETURN ELECTRODE: Brand: VALLEYLAB

## (undated) DEVICE — SOL IRR SOD CL 0.9% 3000ML BG --

## (undated) DEVICE — SUT ETHLN 3-0 18IN PS2 BLK --

## (undated) DEVICE — SOLUTION IV 500ML 0.9% SOD CHL FLX CONT

## (undated) DEVICE — INFECTION CONTROL KIT SYS

## (undated) DEVICE — SUTURE NONABSORBABLE MONOFILAMENT 5-0 C-1 1X24 IN PROLENE 8725H

## (undated) DEVICE — SUTURE VCRL SZ 3-0 L27IN ABSRB UD L26MM SH 1/2 CIR J416H

## (undated) DEVICE — TRAY PREP DRY W/ PREM GLV 2 APPL 6 SPNG 2 UNDPD 1 OVERWRAP

## (undated) DEVICE — TRAY CATH OD16FR SIL URIN M STATLOK STBL DEV SURSTP

## (undated) DEVICE — NEEDLE HYPO 25GA L1.5IN BVL ORIENTED ECLIPSE

## (undated) DEVICE — Device

## (undated) DEVICE — SUTURE VCRL SZ 0 L54IN ABSRB VLT LIGAPAK REEL NDL J207G

## (undated) DEVICE — DEVON™ KNEE AND BODY STRAP 60" X 3" (1.5 M X 7.6 CM): Brand: DEVON

## (undated) DEVICE — SYR 3ML LL TIP 1/10ML GRAD --

## (undated) DEVICE — STOCKINETTE TUBE BLN 2PLY 6X72 -- MEDICHOICE CONVERT TO 363488

## (undated) DEVICE — HANDLE LT SNAP ON ULT DURABLE LENS FOR TRUMPF ALC DISPOSABLE

## (undated) DEVICE — SUTURE VCRL SZ 3-0 L27IN ABSRB UD FS-2 L19MM 1/2 CIR J423H

## (undated) DEVICE — DRAPE SURG W41XL74IN CLR FULL SZ C ARM 3 ADH POLY STRP E

## (undated) DEVICE — BAG ISOL TRNSPRT 18X18.5IN LF --

## (undated) DEVICE — SUTURE PROL 5-0 L18IN NONABSORBABLE BLU RB-2 L13MM 1/2 CIR 8713H

## (undated) DEVICE — APPLICATOR BNDG 1MM ADH PREMIERPRO EXOFIN

## (undated) DEVICE — SUTURE VCRL SZ 2-0 L27IN ABSRB UD L26MM SH 1/2 CIR J417H

## (undated) DEVICE — (D)PREP SKN CHLRAPRP APPL 26ML -- CONVERT TO ITEM 371833

## (undated) DEVICE — BULB SYRINGE, IRRIGATION WITH PROTECTIVE CAP, 60 CC, INDIVIDUALLY WRAPPED: Brand: DOVER

## (undated) DEVICE — AGENT HEMSTAT W2XL14IN OXIDIZED REGENERATED CELOS ABSRB FOR

## (undated) DEVICE — FOGARTY ARTERIAL EMBOLECTOMY CATHETER 4F 80CM: Brand: FOGARTY

## (undated) DEVICE — 1200 GUARD II KIT W/5MM TUBE W/O VAC TUBE: Brand: GUARDIAN

## (undated) DEVICE — SLIM BODY SKIN STAPLER: Brand: APPOSE ULC

## (undated) DEVICE — STRAP,POSITIONING,KNEE/BODY,FOAM,4X60": Brand: MEDLINE

## (undated) DEVICE — 5F PLUS 40CM OVER-THE-WIRE EMBOLECTOMY CATHETER, EIFU: Brand: LEMAITRE EMBOLECTOMY CATHETER

## (undated) DEVICE — AMC/4 ARTERIAL NEEDLE – 18GA X 2.75” (7CM): Brand: ARTERIAL NEEDLE

## (undated) DEVICE — 3M™ IOBAN™ 2 ANTIMICROBIAL INCISE DRAPE 6650EZ: Brand: IOBAN™ 2

## (undated) DEVICE — FOGARTY THRU-LUMEN EMBOLECTOMY CATHETER 6F 80CM: Brand: FOGARTY

## (undated) DEVICE — CATHETER PTCA L80CM BLLN L40MM DIA10MM INTRO SHTH 6FR 7ATM

## (undated) DEVICE — RADIFOCUS GLIDEWIRE: Brand: GLIDEWIRE

## (undated) DEVICE — MEDI-VAC NON-CONDUCTIVE SUCTION TUBING: Brand: CARDINAL HEALTH

## (undated) DEVICE — SPONGE LAP 18X18IN STRL -- 5/PK

## (undated) DEVICE — TOWEL SURG W17XL27IN STD BLU COT NONFENESTRATED PREWASHED

## (undated) DEVICE — TRAY CATH W/ 16FR CATH URIN M CTRL FIT OUTLT TB F STATLOK

## (undated) DEVICE — SYR 10ML LUER LOK 1/5ML GRAD --

## (undated) DEVICE — DEVICE INFL 20ML L13IN 30ATM CLR POLYCARB TB PRTBL DGT

## (undated) DEVICE — HANDPIECE SET WITH BONE CLEANING TIP AND SUCTION TUBE: Brand: INTERPULSE

## (undated) DEVICE — SUTURE PERMAHAND SZ 0 L30IN NONABSORBABLE BLK SILK BRAID A306H

## (undated) DEVICE — SUT PROL 6-0 18IN BV1 DA BLU --

## (undated) DEVICE — DRAPE,EXTREMITY,89X128,STERILE: Brand: MEDLINE

## (undated) DEVICE — GUIDEWIRE VASC L180CM DIA0.035IN TIP L7CM PTFE S STL STR

## (undated) DEVICE — SOLUTION IV 1000ML 0.9% SOD CHL

## (undated) DEVICE — SUT ETHLN 3-0 18IN PS1 BLK --